# Patient Record
Sex: FEMALE | Race: WHITE | Employment: FULL TIME | URBAN - METROPOLITAN AREA
[De-identification: names, ages, dates, MRNs, and addresses within clinical notes are randomized per-mention and may not be internally consistent; named-entity substitution may affect disease eponyms.]

---

## 2017-11-09 ENCOUNTER — NON-PROVIDER VISIT (OUTPATIENT)
Dept: OBGYN | Facility: CLINIC | Age: 22
End: 2017-11-09

## 2017-11-09 DIAGNOSIS — Z32.02 PREGNANCY EXAMINATION OR TEST, NEGATIVE RESULT: ICD-10-CM

## 2017-11-09 LAB
INT CON NEG: NEGATIVE
INT CON POS: POSITIVE
POC URINE PREGNANCY TEST: NEGATIVE

## 2017-11-09 PROCEDURE — 81025 URINE PREGNANCY TEST: CPT | Performed by: OBSTETRICS & GYNECOLOGY

## 2018-01-01 ENCOUNTER — RESOLUTE PROFESSIONAL BILLING HOSPITAL PROF FEE (OUTPATIENT)
Dept: MEDSURG UNIT | Facility: MEDICAL CENTER | Age: 23
End: 2018-01-01
Payer: MEDICAID

## 2018-01-01 ENCOUNTER — HOSPITAL ENCOUNTER (INPATIENT)
Facility: MEDICAL CENTER | Age: 23
LOS: 3 days | DRG: 781 | End: 2018-01-04
Attending: EMERGENCY MEDICINE | Admitting: INTERNAL MEDICINE
Payer: MEDICAID

## 2018-01-01 ENCOUNTER — APPOINTMENT (OUTPATIENT)
Dept: RADIOLOGY | Facility: MEDICAL CENTER | Age: 23
DRG: 781 | End: 2018-01-01
Attending: EMERGENCY MEDICINE
Payer: MEDICAID

## 2018-01-01 PROBLEM — E87.1 HYPONATREMIA: Status: ACTIVE | Noted: 2018-01-01

## 2018-01-01 PROBLEM — E87.6 HYPOKALEMIA: Status: ACTIVE | Noted: 2018-01-01

## 2018-01-01 PROBLEM — O23.01 PYELONEPHRITIS AFFECTING PREGNANCY IN FIRST TRIMESTER: Status: ACTIVE | Noted: 2018-01-01

## 2018-01-01 PROBLEM — R11.2 NAUSEA & VOMITING: Status: ACTIVE | Noted: 2018-01-01

## 2018-01-01 PROBLEM — A41.9 SEPSIS (HCC): Status: ACTIVE | Noted: 2018-01-01

## 2018-01-01 LAB
ALBUMIN SERPL BCP-MCNC: 4.3 G/DL (ref 3.2–4.9)
ALBUMIN/GLOB SERPL: 1.5 G/DL
ALP SERPL-CCNC: 55 U/L (ref 30–99)
ALT SERPL-CCNC: 18 U/L (ref 2–50)
ANION GAP SERPL CALC-SCNC: 8 MMOL/L (ref 0–11.9)
APPEARANCE UR: ABNORMAL
AST SERPL-CCNC: 17 U/L (ref 12–45)
BACTERIA #/AREA URNS HPF: ABNORMAL /HPF
BASOPHILS # BLD AUTO: 0.1 % (ref 0–1.8)
BASOPHILS # BLD: 0.01 K/UL (ref 0–0.12)
BILIRUB SERPL-MCNC: 0.7 MG/DL (ref 0.1–1.5)
BILIRUB UR QL STRIP.AUTO: NEGATIVE
BUN SERPL-MCNC: 6 MG/DL (ref 8–22)
CALCIUM SERPL-MCNC: 9.3 MG/DL (ref 8.5–10.5)
CHLORIDE SERPL-SCNC: 100 MMOL/L (ref 96–112)
CO2 SERPL-SCNC: 21 MMOL/L (ref 20–33)
COLOR UR: YELLOW
CREAT SERPL-MCNC: 0.57 MG/DL (ref 0.5–1.4)
CULTURE IF INDICATED INDCX: YES UA CULTURE
EOSINOPHIL # BLD AUTO: 0 K/UL (ref 0–0.51)
EOSINOPHIL NFR BLD: 0 % (ref 0–6.9)
EPI CELLS #/AREA URNS HPF: ABNORMAL /HPF
ERYTHROCYTE [DISTWIDTH] IN BLOOD BY AUTOMATED COUNT: 42.6 FL (ref 35.9–50)
GFR SERPL CREATININE-BSD FRML MDRD: >60 ML/MIN/1.73 M 2
GLOBULIN SER CALC-MCNC: 2.9 G/DL (ref 1.9–3.5)
GLUCOSE SERPL-MCNC: 99 MG/DL (ref 65–99)
GLUCOSE UR STRIP.AUTO-MCNC: NEGATIVE MG/DL
HCG SERPL QL: POSITIVE
HCT VFR BLD AUTO: 40 % (ref 37–47)
HGB BLD-MCNC: 14.2 G/DL (ref 12–16)
HYALINE CASTS #/AREA URNS LPF: ABNORMAL /LPF
IMM GRANULOCYTES # BLD AUTO: 0.05 K/UL (ref 0–0.11)
IMM GRANULOCYTES NFR BLD AUTO: 0.4 % (ref 0–0.9)
KETONES UR STRIP.AUTO-MCNC: 15 MG/DL
LACTATE BLD-SCNC: 1.2 MMOL/L (ref 0.5–2)
LEUKOCYTE ESTERASE UR QL STRIP.AUTO: ABNORMAL
LIPASE SERPL-CCNC: 10 U/L (ref 11–82)
LYMPHOCYTES # BLD AUTO: 0.92 K/UL (ref 1–4.8)
LYMPHOCYTES NFR BLD: 6.7 % (ref 22–41)
MAGNESIUM SERPL-MCNC: 2 MG/DL (ref 1.5–2.5)
MCH RBC QN AUTO: 33.8 PG (ref 27–33)
MCHC RBC AUTO-ENTMCNC: 35.5 G/DL (ref 33.6–35)
MCV RBC AUTO: 95.2 FL (ref 81.4–97.8)
MICRO URNS: ABNORMAL
MONOCYTES # BLD AUTO: 1.08 K/UL (ref 0–0.85)
MONOCYTES NFR BLD AUTO: 7.8 % (ref 0–13.4)
NEUTROPHILS # BLD AUTO: 11.72 K/UL (ref 2–7.15)
NEUTROPHILS NFR BLD: 85 % (ref 44–72)
NITRITE UR QL STRIP.AUTO: POSITIVE
NRBC # BLD AUTO: 0 K/UL
NRBC BLD-RTO: 0 /100 WBC
PH UR STRIP.AUTO: 5.5 [PH]
PHOSPHATE SERPL-MCNC: 2.7 MG/DL (ref 2.5–4.5)
PLATELET # BLD AUTO: 186 K/UL (ref 164–446)
PMV BLD AUTO: 9.2 FL (ref 9–12.9)
POTASSIUM SERPL-SCNC: 3 MMOL/L (ref 3.6–5.5)
PROT SERPL-MCNC: 7.2 G/DL (ref 6–8.2)
PROT UR QL STRIP: 30 MG/DL
RBC # BLD AUTO: 4.2 M/UL (ref 4.2–5.4)
RBC # URNS HPF: ABNORMAL /HPF
RBC UR QL AUTO: ABNORMAL
SODIUM SERPL-SCNC: 129 MMOL/L (ref 135–145)
SP GR UR STRIP.AUTO: 1.02
UROBILINOGEN UR STRIP.AUTO-MCNC: 2 MG/DL
WBC # BLD AUTO: 13.8 K/UL (ref 4.8–10.8)
WBC #/AREA URNS HPF: ABNORMAL /HPF

## 2018-01-01 PROCEDURE — 80053 COMPREHEN METABOLIC PANEL: CPT

## 2018-01-01 PROCEDURE — 96374 THER/PROPH/DIAG INJ IV PUSH: CPT

## 2018-01-01 PROCEDURE — 87086 URINE CULTURE/COLONY COUNT: CPT

## 2018-01-01 PROCEDURE — 84703 CHORIONIC GONADOTROPIN ASSAY: CPT

## 2018-01-01 PROCEDURE — 700102 HCHG RX REV CODE 250 W/ 637 OVERRIDE(OP): Performed by: EMERGENCY MEDICINE

## 2018-01-01 PROCEDURE — A9270 NON-COVERED ITEM OR SERVICE: HCPCS | Performed by: EMERGENCY MEDICINE

## 2018-01-01 PROCEDURE — 76815 OB US LIMITED FETUS(S): CPT

## 2018-01-01 PROCEDURE — A9270 NON-COVERED ITEM OR SERVICE: HCPCS

## 2018-01-01 PROCEDURE — 81001 URINALYSIS AUTO W/SCOPE: CPT

## 2018-01-01 PROCEDURE — 700111 HCHG RX REV CODE 636 W/ 250 OVERRIDE (IP): Performed by: EMERGENCY MEDICINE

## 2018-01-01 PROCEDURE — 76817 TRANSVAGINAL US OBSTETRIC: CPT

## 2018-01-01 PROCEDURE — 36415 COLL VENOUS BLD VENIPUNCTURE: CPT

## 2018-01-01 PROCEDURE — 87186 SC STD MICRODIL/AGAR DIL: CPT

## 2018-01-01 PROCEDURE — 85025 COMPLETE CBC W/AUTO DIFF WBC: CPT

## 2018-01-01 PROCEDURE — 83605 ASSAY OF LACTIC ACID: CPT

## 2018-01-01 PROCEDURE — 87040 BLOOD CULTURE FOR BACTERIA: CPT | Mod: 91

## 2018-01-01 PROCEDURE — 770020 HCHG ROOM/CARE - TELE (206)

## 2018-01-01 PROCEDURE — 96375 TX/PRO/DX INJ NEW DRUG ADDON: CPT

## 2018-01-01 PROCEDURE — 87077 CULTURE AEROBIC IDENTIFY: CPT

## 2018-01-01 PROCEDURE — 83690 ASSAY OF LIPASE: CPT

## 2018-01-01 PROCEDURE — A9270 NON-COVERED ITEM OR SERVICE: HCPCS | Performed by: STUDENT IN AN ORGANIZED HEALTH CARE EDUCATION/TRAINING PROGRAM

## 2018-01-01 PROCEDURE — 84100 ASSAY OF PHOSPHORUS: CPT

## 2018-01-01 PROCEDURE — 99285 EMERGENCY DEPT VISIT HI MDM: CPT

## 2018-01-01 PROCEDURE — 700105 HCHG RX REV CODE 258: Performed by: EMERGENCY MEDICINE

## 2018-01-01 PROCEDURE — 700102 HCHG RX REV CODE 250 W/ 637 OVERRIDE(OP): Performed by: STUDENT IN AN ORGANIZED HEALTH CARE EDUCATION/TRAINING PROGRAM

## 2018-01-01 PROCEDURE — 700102 HCHG RX REV CODE 250 W/ 637 OVERRIDE(OP)

## 2018-01-01 PROCEDURE — 94760 N-INVAS EAR/PLS OXIMETRY 1: CPT

## 2018-01-01 PROCEDURE — 700105 HCHG RX REV CODE 258: Performed by: STUDENT IN AN ORGANIZED HEALTH CARE EDUCATION/TRAINING PROGRAM

## 2018-01-01 PROCEDURE — 83735 ASSAY OF MAGNESIUM: CPT

## 2018-01-01 PROCEDURE — 700111 HCHG RX REV CODE 636 W/ 250 OVERRIDE (IP): Performed by: STUDENT IN AN ORGANIZED HEALTH CARE EDUCATION/TRAINING PROGRAM

## 2018-01-01 RX ORDER — POTASSIUM CHLORIDE 20 MEQ/1
40 TABLET, EXTENDED RELEASE ORAL DAILY
Status: DISCONTINUED | OUTPATIENT
Start: 2018-01-02 | End: 2018-01-01

## 2018-01-01 RX ORDER — VITAMIN A ACETATE, BETA CAROTENE, ASCORBIC ACID, CHOLECALCIFEROL, .ALPHA.-TOCOPHEROL ACETATE, DL-, THIAMINE MONONITRATE, RIBOFLAVIN, NIACINAMIDE, PYRIDOXINE HYDROCHLORIDE, FOLIC ACID, CYANOCOBALAMIN, CALCIUM CARBONATE, FERROUS FUMARATE, ZINC OXIDE, CUPRIC OXIDE 3080; 12; 120; 400; 1; 1.84; 3; 20; 22; 920; 25; 200; 27; 10; 2 [IU]/1; UG/1; MG/1; [IU]/1; MG/1; MG/1; MG/1; MG/1; MG/1; [IU]/1; MG/1; MG/1; MG/1; MG/1; MG/1
1 TABLET, FILM COATED ORAL DAILY
Status: DISCONTINUED | OUTPATIENT
Start: 2018-01-02 | End: 2018-01-04 | Stop reason: HOSPADM

## 2018-01-01 RX ORDER — ONDANSETRON 2 MG/ML
4 INJECTION INTRAMUSCULAR; INTRAVENOUS ONCE
Status: COMPLETED | OUTPATIENT
Start: 2018-01-01 | End: 2018-01-01

## 2018-01-01 RX ORDER — CEFTRIAXONE 1 G/1
1 INJECTION, POWDER, FOR SOLUTION INTRAMUSCULAR; INTRAVENOUS ONCE
Status: COMPLETED | OUTPATIENT
Start: 2018-01-01 | End: 2018-01-01

## 2018-01-01 RX ORDER — PYRIDOXINE HCL (VITAMIN B6) 25 MG
25 TABLET ORAL EVERY EVENING
Status: DISCONTINUED | OUTPATIENT
Start: 2018-01-01 | End: 2018-01-04 | Stop reason: HOSPADM

## 2018-01-01 RX ORDER — SODIUM CHLORIDE 9 MG/ML
1000 INJECTION, SOLUTION INTRAVENOUS ONCE
Status: COMPLETED | OUTPATIENT
Start: 2018-01-01 | End: 2018-01-01

## 2018-01-01 RX ORDER — ONDANSETRON 2 MG/ML
4 INJECTION INTRAMUSCULAR; INTRAVENOUS EVERY 6 HOURS PRN
Status: DISCONTINUED | OUTPATIENT
Start: 2018-01-01 | End: 2018-01-04 | Stop reason: HOSPADM

## 2018-01-01 RX ORDER — SODIUM CHLORIDE 9 MG/ML
INJECTION, SOLUTION INTRAVENOUS CONTINUOUS
Status: DISCONTINUED | OUTPATIENT
Start: 2018-01-01 | End: 2018-01-04 | Stop reason: HOSPADM

## 2018-01-01 RX ORDER — ACETAMINOPHEN 500 MG
1000 TABLET ORAL EVERY 6 HOURS PRN
COMMUNITY
End: 2022-10-04

## 2018-01-01 RX ORDER — POTASSIUM CHLORIDE 20 MEQ/1
40 TABLET, EXTENDED RELEASE ORAL ONCE
Status: COMPLETED | OUTPATIENT
Start: 2018-01-01 | End: 2018-01-01

## 2018-01-01 RX ORDER — ACETAMINOPHEN 325 MG/1
1000 TABLET ORAL ONCE
Status: DISCONTINUED | OUTPATIENT
Start: 2018-01-01 | End: 2018-01-01

## 2018-01-01 RX ORDER — CEFTRIAXONE 1 G/1
1 INJECTION, POWDER, FOR SOLUTION INTRAMUSCULAR; INTRAVENOUS ONCE
Status: DISCONTINUED | OUTPATIENT
Start: 2018-01-02 | End: 2018-01-01

## 2018-01-01 RX ORDER — ACETAMINOPHEN 325 MG/1
325 TABLET ORAL ONCE
Status: COMPLETED | OUTPATIENT
Start: 2018-01-01 | End: 2018-01-01

## 2018-01-01 RX ORDER — ACETAMINOPHEN 325 MG/1
650 TABLET ORAL ONCE
Status: COMPLETED | OUTPATIENT
Start: 2018-01-01 | End: 2018-01-01

## 2018-01-01 RX ADMIN — THIAMINE HYDROCHLORIDE 100 MG: 100 INJECTION, SOLUTION INTRAMUSCULAR; INTRAVENOUS at 23:49

## 2018-01-01 RX ADMIN — ACETAMINOPHEN 650 MG: 325 TABLET, FILM COATED ORAL at 20:50

## 2018-01-01 RX ADMIN — CEFTRIAXONE SODIUM 1 G: 1 INJECTION, POWDER, FOR SOLUTION INTRAMUSCULAR; INTRAVENOUS at 20:50

## 2018-01-01 RX ADMIN — POTASSIUM CHLORIDE 40 MEQ: 1500 TABLET, EXTENDED RELEASE ORAL at 23:40

## 2018-01-01 RX ADMIN — POTASSIUM CHLORIDE 40 MEQ: 2 INJECTION, SOLUTION, CONCENTRATE INTRAVENOUS at 23:43

## 2018-01-01 RX ADMIN — ONDANSETRON 4 MG: 2 INJECTION INTRAMUSCULAR; INTRAVENOUS at 20:50

## 2018-01-01 RX ADMIN — DOXYLAMINE SUCCINATE 25 MG: 25 TABLET ORAL at 23:42

## 2018-01-01 RX ADMIN — SODIUM CHLORIDE 1000 ML: 9 INJECTION, SOLUTION INTRAVENOUS at 20:50

## 2018-01-01 RX ADMIN — Medication 25 MG: at 23:42

## 2018-01-01 RX ADMIN — ACETAMINOPHEN 325 MG: 325 TABLET, FILM COATED ORAL at 19:00

## 2018-01-01 ASSESSMENT — PAIN SCALES - GENERAL
PAINLEVEL_OUTOF10: 9
PAINLEVEL_OUTOF10: 0

## 2018-01-01 ASSESSMENT — ENCOUNTER SYMPTOMS
HEADACHES: 1
SHORTNESS OF BREATH: 0
DIARRHEA: 0
BLURRED VISION: 0
CHILLS: 1
DIAPHORESIS: 0
LOSS OF CONSCIOUSNESS: 0
SPUTUM PRODUCTION: 0
CONSTIPATION: 0
HEMOPTYSIS: 0
FOCAL WEAKNESS: 0
SEIZURES: 0
DOUBLE VISION: 0
DIZZINESS: 0
COUGH: 0
FEVER: 1
FALLS: 0
FLANK PAIN: 1
PALPITATIONS: 0
ABDOMINAL PAIN: 0
NAUSEA: 1
BACK PAIN: 1
WEAKNESS: 0
DEPRESSION: 0
VOMITING: 1

## 2018-01-01 ASSESSMENT — COGNITIVE AND FUNCTIONAL STATUS - GENERAL
SUGGESTED CMS G CODE MODIFIER DAILY ACTIVITY: CH
DAILY ACTIVITIY SCORE: 24
SUGGESTED CMS G CODE MODIFIER MOBILITY: CH
MOBILITY SCORE: 24

## 2018-01-01 ASSESSMENT — COPD QUESTIONNAIRES
DURING THE PAST 4 WEEKS HOW MUCH DID YOU FEEL SHORT OF BREATH: NONE/LITTLE OF THE TIME
DO YOU EVER COUGH UP ANY MUCUS OR PHLEGM?: NO/ONLY WITH OCCASIONAL COLDS OR INFECTIONS
HAVE YOU SMOKED AT LEAST 100 CIGARETTES IN YOUR ENTIRE LIFE: NO/DON'T KNOW
COPD SCREENING SCORE: 0

## 2018-01-01 ASSESSMENT — LIFESTYLE VARIABLES
EVER_SMOKED: NEVER
ALCOHOL_USE: NO

## 2018-01-01 ASSESSMENT — PATIENT HEALTH QUESTIONNAIRE - PHQ9
SUM OF ALL RESPONSES TO PHQ QUESTIONS 1-9: 0
SUM OF ALL RESPONSES TO PHQ9 QUESTIONS 1 AND 2: 0
2. FEELING DOWN, DEPRESSED, IRRITABLE, OR HOPELESS: NOT AT ALL
1. LITTLE INTEREST OR PLEASURE IN DOING THINGS: NOT AT ALL

## 2018-01-01 NOTE — ED NOTES
Chief Complaint   Patient presents with   • Headache     Pt BIB self to triage for c/o fever/headache/flank pain since Fri/ pt reports to be 9 wks preg/ pt reports mild discomfort while urinating   • Vomiting   • Nausea   • Fever   • Flank Pain     Explained to pt triage process, made pt aware to tell this RN of any changes/concerns, pt verbalized understanding of process and instructions given. Pt to [facility] stone.

## 2018-01-02 LAB
ALBUMIN SERPL BCP-MCNC: 3.5 G/DL (ref 3.2–4.9)
ALBUMIN/GLOB SERPL: 1.2 G/DL
ALP SERPL-CCNC: 58 U/L (ref 30–99)
ALT SERPL-CCNC: 14 U/L (ref 2–50)
ANION GAP SERPL CALC-SCNC: 10 MMOL/L (ref 0–11.9)
AST SERPL-CCNC: 13 U/L (ref 12–45)
BASOPHILS # BLD AUTO: 0.1 % (ref 0–1.8)
BASOPHILS # BLD: 0.02 K/UL (ref 0–0.12)
BILIRUB SERPL-MCNC: 1 MG/DL (ref 0.1–1.5)
BUN SERPL-MCNC: 6 MG/DL (ref 8–22)
CALCIUM SERPL-MCNC: 9 MG/DL (ref 8.5–10.5)
CHLORIDE SERPL-SCNC: 107 MMOL/L (ref 96–112)
CO2 SERPL-SCNC: 18 MMOL/L (ref 20–33)
CREAT SERPL-MCNC: 0.5 MG/DL (ref 0.5–1.4)
EOSINOPHIL # BLD AUTO: 0 K/UL (ref 0–0.51)
EOSINOPHIL NFR BLD: 0 % (ref 0–6.9)
ERYTHROCYTE [DISTWIDTH] IN BLOOD BY AUTOMATED COUNT: 42.5 FL (ref 35.9–50)
GFR SERPL CREATININE-BSD FRML MDRD: >60 ML/MIN/1.73 M 2
GLOBULIN SER CALC-MCNC: 2.9 G/DL (ref 1.9–3.5)
GLUCOSE SERPL-MCNC: 90 MG/DL (ref 65–99)
HCT VFR BLD AUTO: 39 % (ref 37–47)
HGB BLD-MCNC: 13.6 G/DL (ref 12–16)
IMM GRANULOCYTES # BLD AUTO: 0.09 K/UL (ref 0–0.11)
IMM GRANULOCYTES NFR BLD AUTO: 0.6 % (ref 0–0.9)
LACTATE BLD-SCNC: 1 MMOL/L (ref 0.5–2)
LYMPHOCYTES # BLD AUTO: 0.75 K/UL (ref 1–4.8)
LYMPHOCYTES NFR BLD: 5.2 % (ref 22–41)
MCH RBC QN AUTO: 32.8 PG (ref 27–33)
MCHC RBC AUTO-ENTMCNC: 34.9 G/DL (ref 33.6–35)
MCV RBC AUTO: 94 FL (ref 81.4–97.8)
MONOCYTES # BLD AUTO: 1.26 K/UL (ref 0–0.85)
MONOCYTES NFR BLD AUTO: 8.8 % (ref 0–13.4)
NEUTROPHILS # BLD AUTO: 12.23 K/UL (ref 2–7.15)
NEUTROPHILS NFR BLD: 85.3 % (ref 44–72)
NRBC # BLD AUTO: 0 K/UL
NRBC BLD-RTO: 0 /100 WBC
PLATELET # BLD AUTO: 161 K/UL (ref 164–446)
PMV BLD AUTO: 9.5 FL (ref 9–12.9)
POTASSIUM SERPL-SCNC: 4 MMOL/L (ref 3.6–5.5)
PROT SERPL-MCNC: 6.4 G/DL (ref 6–8.2)
RBC # BLD AUTO: 4.15 M/UL (ref 4.2–5.4)
SODIUM SERPL-SCNC: 135 MMOL/L (ref 135–145)
WBC # BLD AUTO: 14.4 K/UL (ref 4.8–10.8)

## 2018-01-02 PROCEDURE — 83605 ASSAY OF LACTIC ACID: CPT

## 2018-01-02 PROCEDURE — 700101 HCHG RX REV CODE 250: Performed by: STUDENT IN AN ORGANIZED HEALTH CARE EDUCATION/TRAINING PROGRAM

## 2018-01-02 PROCEDURE — 700111 HCHG RX REV CODE 636 W/ 250 OVERRIDE (IP): Performed by: INTERNAL MEDICINE

## 2018-01-02 PROCEDURE — 85025 COMPLETE CBC W/AUTO DIFF WBC: CPT

## 2018-01-02 PROCEDURE — 80053 COMPREHEN METABOLIC PANEL: CPT

## 2018-01-02 PROCEDURE — 700105 HCHG RX REV CODE 258: Performed by: STUDENT IN AN ORGANIZED HEALTH CARE EDUCATION/TRAINING PROGRAM

## 2018-01-02 PROCEDURE — 770020 HCHG ROOM/CARE - TELE (206)

## 2018-01-02 PROCEDURE — 700102 HCHG RX REV CODE 250 W/ 637 OVERRIDE(OP): Performed by: STUDENT IN AN ORGANIZED HEALTH CARE EDUCATION/TRAINING PROGRAM

## 2018-01-02 PROCEDURE — A9270 NON-COVERED ITEM OR SERVICE: HCPCS | Performed by: STUDENT IN AN ORGANIZED HEALTH CARE EDUCATION/TRAINING PROGRAM

## 2018-01-02 PROCEDURE — 700111 HCHG RX REV CODE 636 W/ 250 OVERRIDE (IP): Performed by: STUDENT IN AN ORGANIZED HEALTH CARE EDUCATION/TRAINING PROGRAM

## 2018-01-02 PROCEDURE — 99223 1ST HOSP IP/OBS HIGH 75: CPT | Mod: GC | Performed by: INTERNAL MEDICINE

## 2018-01-02 PROCEDURE — 3E0234Z INTRODUCTION OF SERUM, TOXOID AND VACCINE INTO MUSCLE, PERCUTANEOUS APPROACH: ICD-10-PCS | Performed by: INTERNAL MEDICINE

## 2018-01-02 PROCEDURE — 90471 IMMUNIZATION ADMIN: CPT

## 2018-01-02 PROCEDURE — 36415 COLL VENOUS BLD VENIPUNCTURE: CPT

## 2018-01-02 PROCEDURE — 90686 IIV4 VACC NO PRSV 0.5 ML IM: CPT | Performed by: INTERNAL MEDICINE

## 2018-01-02 RX ORDER — ACETAMINOPHEN 325 MG/1
650 TABLET ORAL EVERY 6 HOURS PRN
Status: DISCONTINUED | OUTPATIENT
Start: 2018-01-02 | End: 2018-01-04 | Stop reason: HOSPADM

## 2018-01-02 RX ORDER — SODIUM CHLORIDE 9 MG/ML
1000 INJECTION, SOLUTION INTRAVENOUS ONCE
Status: COMPLETED | OUTPATIENT
Start: 2018-01-02 | End: 2018-01-02

## 2018-01-02 RX ADMIN — FOLIC ACID 1 MG: 5 INJECTION, SOLUTION INTRAMUSCULAR; INTRAVENOUS; SUBCUTANEOUS at 00:20

## 2018-01-02 RX ADMIN — DOXYLAMINE SUCCINATE 25 MG: 25 TABLET ORAL at 21:47

## 2018-01-02 RX ADMIN — Medication 1 TABLET: at 08:16

## 2018-01-02 RX ADMIN — SODIUM CHLORIDE 1000 ML: 9 INJECTION, SOLUTION INTRAVENOUS at 08:00

## 2018-01-02 RX ADMIN — SODIUM CHLORIDE: 9 INJECTION, SOLUTION INTRAVENOUS at 17:40

## 2018-01-02 RX ADMIN — INFLUENZA A VIRUS A/MICHIGAN/45/2015 X-275 (H1N1) ANTIGEN (FORMALDEHYDE INACTIVATED), INFLUENZA A VIRUS A/HONG KONG/4801/2014 X-263B (H3N2) ANTIGEN (FORMALDEHYDE INACTIVATED), INFLUENZA B VIRUS B/PHUKET/3073/2013 ANTIGEN (FORMALDEHYDE INACTIVATED), AND INFLUENZA B VIRUS B/BRISBANE/60/2008 ANTIGEN (FORMALDEHYDE INACTIVATED) 0.5 ML: 15; 15; 15; 15 INJECTION, SUSPENSION INTRAMUSCULAR at 08:16

## 2018-01-02 RX ADMIN — SODIUM CHLORIDE: 9 INJECTION, SOLUTION INTRAVENOUS at 21:50

## 2018-01-02 RX ADMIN — SODIUM CHLORIDE: 9 INJECTION, SOLUTION INTRAVENOUS at 09:32

## 2018-01-02 RX ADMIN — Medication 25 MG: at 21:47

## 2018-01-02 RX ADMIN — ACETAMINOPHEN 650 MG: 325 TABLET, FILM COATED ORAL at 09:11

## 2018-01-02 RX ADMIN — SODIUM CHLORIDE: 9 INJECTION, SOLUTION INTRAVENOUS at 03:05

## 2018-01-02 RX ADMIN — WATER 1000 MG: 1 INJECTION INTRAMUSCULAR; INTRAVENOUS; SUBCUTANEOUS at 11:37

## 2018-01-02 RX ADMIN — ACETAMINOPHEN 650 MG: 325 TABLET, FILM COATED ORAL at 22:21

## 2018-01-02 ASSESSMENT — ENCOUNTER SYMPTOMS
SHORTNESS OF BREATH: 0
BLURRED VISION: 0
CONSTIPATION: 0
RESPIRATORY NEGATIVE: 1
FEVER: 0
WEAKNESS: 0
NEUROLOGICAL NEGATIVE: 1
PALPITATIONS: 0
HEADACHES: 0
CONSTITUTIONAL NEGATIVE: 1
NAUSEA: 0
VOMITING: 0
DIZZINESS: 0
GASTROINTESTINAL NEGATIVE: 1
BRUISES/BLEEDS EASILY: 0
FALLS: 0
MUSCULOSKELETAL NEGATIVE: 1
CARDIOVASCULAR NEGATIVE: 1
SORE THROAT: 0
COUGH: 0
MYALGIAS: 0
EYES NEGATIVE: 1
DIAPHORESIS: 0
CHILLS: 0
DIARRHEA: 0
HEARTBURN: 0
ABDOMINAL PAIN: 0

## 2018-01-02 ASSESSMENT — PAIN SCALES - GENERAL
PAINLEVEL_OUTOF10: 0
PAINLEVEL_OUTOF10: 3
PAINLEVEL_OUTOF10: 0

## 2018-01-02 NOTE — PROGRESS NOTES
PT arrived to floor accompanied by tele RN and boyfriend on [employer]. PT walked to bed, gait steady, and was placed on tele box and confirmed. Personal items and call bell in reach, whiteboard updated.

## 2018-01-02 NOTE — PROGRESS NOTES
Internal Medicine Interval Note  Note Author: Rosalba Poole M.D.     Name Marlen Jones       1995   Age/Sex 22 y.o. female   MRN [id number]   Code Status Full     After 5PM or if no immediate response to page, please call for cross-coverage  Attending/Team: Ave/ kevyn See Patient List for primary contact information  Call [phone] to page    1st Call - Day Intern (R1):   Virgilio 2nd Call - Day Sr. Resident (R2/R3):   Jacklyn         Reason for interval visit  (Principal Problem)   Pyelonephritis affecting pregnancy in first trimester    Interval Problem Daily Status Update  (24 hours)   Patient states she feels a lot better no longer has abdominal pain, flank pain, nausea/vomiting.  She did spike a fever earlier but was not on acetaminophen.  Review of previous cultures when she had a UTI in  show no growth. We'll continue with ceftriaxone, if she spikes another fever will expand antibiotics.    Review of Systems   Constitutional: Negative.  Negative for chills, diaphoresis and fever.   HENT: Negative.  Negative for hearing loss and sore throat.    Eyes: Negative.  Negative for blurred vision.   Respiratory: Negative.  Negative for cough and shortness of breath.    Cardiovascular: Negative.  Negative for chest pain, palpitations and leg swelling.   Gastrointestinal: Negative.  Negative for abdominal pain, constipation, diarrhea, heartburn, nausea and vomiting.   Genitourinary: Negative.  Negative for dysuria, frequency, hematuria and urgency.   Musculoskeletal: Negative.  Negative for falls and myalgias.   Skin: Negative.  Negative for rash.   Neurological: Negative.  Negative for dizziness, weakness and headaches.   Endo/Heme/Allergies: Negative.  Does not bruise/bleed easily.       Consultants/Specialty  none    Disposition  Inpt    Quality Measures    Reviewed items::  EKG reviewed, Labs reviewed, Medications reviewed and Radiology images reviewed  Navarro catheter::  No Navarro  DVT prophylaxis  "pharmacological::  Enoxaparin (Lovenox)  Ulcer Prophylaxis::  No          Physical Exam       Vitals:    01/01/18 2200 01/01/18 2300 01/02/18 0323 01/02/18 0816   BP:  113/70 108/61 (!) 99/63   Pulse: 95 91 100 (!) 116   Resp: 16 18 18 20   Temp:  (!) 38.1 °C (100.5 °F) 37.9 °C (100.2 °F) (!) 38.9 °C (102.1 °F)   SpO2: 99% 100% 98% 100%   Weight:  58.8 kg (129 lb 10.1 oz)     Height:  1.702 m (5' 7\")       Body mass index is 20.3 kg/m². Weight: 58.8 kg (129 lb 10.1 oz)  Oxygen Therapy:  Pulse Oximetry: 100 %, O2 (LPM): 0, O2 Delivery: None (Room Air)    Physical Exam   Constitutional: She is oriented to person, place, and time and well-developed, well-nourished, and in no distress. No distress.   HENT:   Head: Normocephalic and atraumatic.   Mouth/Throat: Oropharynx is clear and moist. No oropharyngeal exudate.   Eyes: Conjunctivae and EOM are normal. Pupils are equal, round, and reactive to light.   Neck: Normal range of motion. Neck supple.   Cardiovascular: Normal rate, regular rhythm and normal heart sounds.    No murmur heard.  Pulmonary/Chest: Effort normal and breath sounds normal. No respiratory distress. She has no wheezes. She has no rales.   Abdominal: Soft. Bowel sounds are normal. She exhibits no distension. There is no tenderness. There is no rebound and no guarding.   Musculoskeletal: Normal range of motion. She exhibits no edema or tenderness.   Lymphadenopathy:     She has no cervical adenopathy.   Neurological: She is alert and oriented to person, place, and time. No cranial nerve deficit.   Skin: Skin is warm. She is not diaphoretic. No erythema.         Lab Data Review:         1/2/2018  2:19 PM    Recent Labs      01/01/18   1438  01/02/18   0312   SODIUM  129*  135   POTASSIUM  3.0*  4.0   CHLORIDE  100  107   CO2  21  18*   BUN  6*  6*   CREATININE  0.57  0.50   MAGNESIUM  2.0   --    PHOSPHORUS  2.7   --    CALCIUM  9.3  9.0       Recent Labs      01/01/18   1438  01/02/18   0312   ALTSGPT  " 18  14   ASTSGOT  17  13   ALKPHOSPHAT  55  58   TBILIRUBIN  0.7  1.0   LIPASE  10*   --    GLUCOSE  99  90       Recent Labs      18   1438  18   0312   RBC  4.20  4.15*   HEMOGLOBIN  14.2  13.6   HEMATOCRIT  40.0  39.0   PLATELETCT  186  161*       Recent Labs      18   1438  18   0312   WBC  13.8*  14.4*   NEUTSPOLYS  85.00*  85.30*   LYMPHOCYTES  6.70*  5.20*   MONOCYTES  7.80  8.80   EOSINOPHILS  0.00  0.00   BASOPHILS  0.10  0.10   ASTSGOT  17  13   ALTSGPT  18  14   ALKPHOSPHAT  55  58   TBILIRUBIN  0.7  1.0           Assessment/Plan     * Pyelonephritis affecting pregnancy in first trimester   Assessment & Plan    - Dysuria, bilateral flank pain, fever/chills, dysuria and bilateral CVA tenderness on admission  - UA shows evidence of UTI, sepsis 4/4 SIRS criteria   - Urine and blood culture pending  - Continue IV ceftriaxone, she spikes a fever will expand antibiotic coverage, and order renal ultrasound          Sepsis (CMS-HCC)- (present on admission)   Assessment & Plan    - on admission Sepsis 4/4 elevated, qSofa 1/3 (tachynpena). Lactic acid 1.2   - Urine and blood cultures pending  - Continue IV fluids and ceftriaxone          Pregnancy- (present on admission)   Assessment & Plan    Patient , is 9 weeks pregnant.  She is having multiple episodes of emesis daily. This could be secondary to pregnancy (hyperemeisis) vs pyelonephritis   Transvaginal US: There is a single live IUP estimated at 8 weeks 5 days gestational age     Plan   Multivitamins daily   Transvaginal US - rule out ectopic pregnancy. There is a IUP         Nausea & vomiting- (present on admission)   Assessment & Plan    - Patient reported 2-3 episodes of vomiting daily for the past 3 weeks, non bloody  - May be due to pregnancy, pain from pyelonephritis  - started on zofran, Doxylamine 20mg / Pyridoxine 20mg  - No longer complaining of nausea, will DC Zofran        Hypokalemia- (present on admission)   Assessment  & Plan    - Most likely due to dehydration and vomiting, replete as necessary        Hyponatremia- (present on admission)   Assessment & Plan    - Most likely due to dehydration, repleted as necessary

## 2018-01-02 NOTE — ASSESSMENT & PLAN NOTE
Patient , is 9 weeks pregnant.  She is having multiple episodes of emesis daily. This could be secondary to pregnancy (hyperemeisis) vs pyelonephritis   Transvaginal US: There is a single live IUP estimated at 8 weeks 5 days gestational age     Plan   Multivitamins daily   Transvaginal US - rule out ectopic pregnancy. There is a IUP

## 2018-01-02 NOTE — ED NOTES
Rechecked pt's vitals.   , +fever.   Medicated with tylenol 325mg per protocol   Apologized for the wait, informed charge rn.

## 2018-01-02 NOTE — SENIOR ADMIT NOTE
Admission  [facility] ([employer])  Telemetry for severe hypokalemia  Prelim Dx: Pyelonephritis, Hyperemesis gravidarum, rule out ectopic pregnancy, severe hypokalemia    History  Ms Jones is a 22 year old female who presents with fever, dysuria, headache, nausea and vomiting of three days duration. She is  And nine weeks pregnant. She had pyelonephritis during her last pregnancy. She had some abdominal cramping 10 days ago that has since resolved. Denies nuchal rigidity or LOC.     In the ED the patient was medicated with acetaminophen, zofran and ceftriaxone. Blood cultures were drawn prior to antibiotic administration. She received one liter of normal saline after which her pulse decreased from 130 to 100.     Physical exam  Non-toxic  No peritoneal findings, mild suprapubic tenderness, CVA on right (mild)    Assessment/Plan  Sepsis: Tachycardia, fever, leukocytosis. Blood pressure stable. Lactate 1.2. Okay for the floor. Urinary source suggested by history of dysuria, nitrite and leukocyte esterase.   - 150cc/hour (with thiamine and folate)   - Ceftriaxone   - Reassess every four to six hours for improvement  Pyelonephritis: Nitrite, LE on urinalysis. Right CVA.   - Continue ceftriaxone (first dose in ED)   - Blood and urine cultures pending   - IVNS at 150cc/hour  Nausea/Vomiting/Dehydration: Secondary to pyelonephritis vs hyperemesis gravidarum.  Hyperemesis typically peaks at nine weeks (she states she's nine weeks pregnant).    - Doxylamine 20mg / Pyridoxine 20mg at bedtime if able to tolerate PO   - Ondansetron if unable to tolerate PO    - Discontinue zofran when vomiting resolves   - IV fluids with thiamine 100 mg and folic acid 1mg   - Advance diet as tolerated  Abdominal pain/Pregnancy: Prior episodes of abdominal cramping several days ago, now resolved. No bleeding.     - Follow up TVUS   - measure the hCG every two days    - An hCG rise of ?35 percent in two days should be considered potential  ectopic  Hypokalemia: Excellent renal function, replace   - Tele monitoring   - 40 by mouth and 40 IV   - If cannot tolerate oral page MD   - Repeat BMP in the am   - Check magnesium and phosphorous   Hyponatremia: Hypovolemic, secondary to emesis   - IV hydration   - Repeat BMP at 3am

## 2018-01-02 NOTE — PROGRESS NOTES
Report received from PM RN, care of patient assumed. POC discussed at bedside, no immediate concerns stated at this time. Safety measures in place/call light in reach. Will continue to round hourly.   Patient up to bathroom at this time, no mobility issues noted.

## 2018-01-02 NOTE — ASSESSMENT & PLAN NOTE
- Patient reported 2-3 episodes of vomiting daily for the past 3 weeks, non bloody  - May be due to pregnancy, pain from pyelonephritis  - started on zofran, Doxylamine 20mg / Pyridoxine 20mg  - d/c on doxylamine/pyridoxine

## 2018-01-02 NOTE — ED NOTES
Med rec completed by patient interview at bedside  No abx in past 30 days  NKDA confirmed by patient

## 2018-01-02 NOTE — ED PROVIDER NOTES
ED Provider Note    Scribed for Luigi Lam M.D. by Zachary Lee. 2018, 8:27 PM.    Means of arrival: Walk-in  History obtained from: Patient  History limited by: None    CHIEF COMPLAINT  Chief Complaint   Patient presents with   • Headache     Pt BIB self to triage for c/o fever/headache/flank pain since Fri/ pt reports to be 9 wks preg/ pt reports mild discomfort while urinating   • Vomiting   • Nausea   • Fever   • Flank Pain       HPI  Marlen Jones is a 22 y.o. female who presents to the Emergency Department complaining of a constant fever and headache began three days ago. Patient reports associated dysuria that began several days ago, vomiting that began one week ago, nausea, decreased PO solid and fluid intake since onset of the vomiting. He denies vision change, sore throat, chest pain, shortness of breath, focal muscle pain, rashes, or neurologic deficits. Patient is status  at nine weeks by pregnancy test. She reports a history of pyelonephritis at seven months into her first pregnancy. She reports that she began to experience cramping 10 days ago, but now she is only experiencing constant and mild lower back pain. Patient reports good relief of her nausea during her first pregnancy. She is otherwise healthy.    REVIEW OF SYSTEMS  Patient reports headache, fever, dysuria, vomiting, cramping (resolved), lower back pain, nausea, decreased PO solid and fluid intake since onset of the vomiting. Patient denies vision change, sore throat, chest pain, shortness of breath, focal muscle pain, rashes, or neurologic deficits.   C    PAST MEDICAL HISTORY   None noted.    SURGICAL HISTORY  patient denies any surgical history    SOCIAL HISTORY  Social History   Substance Use Topics   • Smoking status: Never Smoker   • Smokeless tobacco: Never Used   • Alcohol use No      History   Drug Use No       FAMILY HISTORY  History reviewed. No pertinent family history.    CURRENT MEDICATIONS  Reviewed.   "See Encounter Summary.     ALLERGIES  Allergies   Allergen Reactions   • Nkda [No Known Drug Allergy]        PHYSICAL EXAM  VITAL SIGNS: /69   Pulse (!) 111   Temp (!) 38.4 °C (101.1 °F)   Resp 20   Ht 1.702 m (5' 7\")   Wt 58.1 kg (128 lb 1.4 oz)   LMP 10/28/2017 (Exact Date)   SpO2 97%   BMI 20.06 kg/m²    Pulse ox interpretation: I interpret this pulse ox as normal.  Constitutional: Alert in no apparent distress.  HENT: Head normocephalic, atraumatic, Bilateral external ears normal, Nose normal.  Eyes: Pupils are equal, extraocular movements intact, Conjunctiva normal, Non-icteric.   Neck: Normal range of motion, Supple, No stridor.   Cardiovascular: Tachycardic rate, Regular rhythm   Thorax & Lungs: No acute respiratory distress, No wheezing, No increased work of breathing.   Abdomen: Soft, Non tender, Non-distended, No pulsatile masses, No peritoneal signs.  Skin: Warm, Dry, No erythema, No rash.   Extremities: Intact distal pulses, No edema, No tenderness, No cyanosis.    Neurologic: Alert , Normal motor function, Normal sensory function, No focal deficits noted.   Psychiatric: Affect normal, Judgment normal, Mood normal.       DIAGNOSTIC STUDIES / PROCEDURES     LABS  Results for orders placed or performed during the hospital encounter of 01/01/18   CBC WITH DIFFERENTIAL   Result Value Ref Range    WBC 13.8 (H) 4.8 - 10.8 K/uL    RBC 4.20 4.20 - 5.40 M/uL    Hemoglobin 14.2 12.0 - 16.0 g/dL    Hematocrit 40.0 37.0 - 47.0 %    MCV 95.2 81.4 - 97.8 fL    MCH 33.8 (H) 27.0 - 33.0 pg    MCHC 35.5 (H) 33.6 - 35.0 g/dL    RDW 42.6 35.9 - 50.0 fL    Platelet Count 186 164 - 446 K/uL    MPV 9.2 9.0 - 12.9 fL    Neutrophils-Polys 85.00 (H) 44.00 - 72.00 %    Lymphocytes 6.70 (L) 22.00 - 41.00 %    Monocytes 7.80 0.00 - 13.40 %    Eosinophils 0.00 0.00 - 6.90 %    Basophils 0.10 0.00 - 1.80 %    Immature Granulocytes 0.40 0.00 - 0.90 %    Nucleated RBC 0.00 /100 WBC    Neutrophils (Absolute) 11.72 (H) 2.00 " - 7.15 K/uL    Lymphs (Absolute) 0.92 (L) 1.00 - 4.80 K/uL    Monos (Absolute) 1.08 (H) 0.00 - 0.85 K/uL    Eos (Absolute) 0.00 0.00 - 0.51 K/uL    Baso (Absolute) 0.01 0.00 - 0.12 K/uL    Immature Granulocytes (abs) 0.05 0.00 - 0.11 K/uL    NRBC (Absolute) 0.00 K/uL   COMP METABOLIC PANEL   Result Value Ref Range    Sodium 129 (L) 135 - 145 mmol/L    Potassium 3.0 (L) 3.6 - 5.5 mmol/L    Chloride 100 96 - 112 mmol/L    Co2 21 20 - 33 mmol/L    Anion Gap 8.0 0.0 - 11.9    Glucose 99 65 - 99 mg/dL    Bun 6 (L) 8 - 22 mg/dL    Creatinine 0.57 0.50 - 1.40 mg/dL    Calcium 9.3 8.5 - 10.5 mg/dL    AST(SGOT) 17 12 - 45 U/L    ALT(SGPT) 18 2 - 50 U/L    Alkaline Phosphatase 55 30 - 99 U/L    Total Bilirubin 0.7 0.1 - 1.5 mg/dL    Albumin 4.3 3.2 - 4.9 g/dL    Total Protein 7.2 6.0 - 8.2 g/dL    Globulin 2.9 1.9 - 3.5 g/dL    A-G Ratio 1.5 g/dL   LIPASE   Result Value Ref Range    Lipase 10 (L) 11 - 82 U/L   URINALYSIS,CULTURE IF INDICATED   Result Value Ref Range    Color Yellow     Character Cloudy (A)     Specific Gravity 1.016 <1.035    Ph 5.5 5.0 - 8.0    Glucose Negative Negative mg/dL    Ketones 15 (A) Negative mg/dL    Protein 30 (A) Negative mg/dL    Bilirubin Negative Negative    Urobilinogen, Urine 2.0 Negative    Nitrite Positive (A) Negative    Leukocyte Esterase Moderate (A) Negative    Occult Blood Moderate (A) Negative    Micro Urine Req Microscopic     Culture Indicated Yes UA Culture   BETA-HCG QUALITATIVE SERUM   Result Value Ref Range    Beta-Hcg Qualitative Serum Positive (A) Negative   URINE MICROSCOPIC (W/UA)   Result Value Ref Range    WBC Packed WBC /hpf    RBC 10-20 (A) /hpf    Bacteria Many (A) None /hpf    Epithelial Cells Few /hpf    Hyaline Cast 3-5 (A) /lpf   ESTIMATED GFR   Result Value Ref Range    GFR If African American >60 >60 mL/min/1.73 m 2    GFR If Non African American >60 >60 mL/min/1.73 m 2     All labs were reviewed by me.    RADIOLOGY  US-OB LIMITED TRANSABDOMINAL   Final Result       1.  There is a single live IUP estimated at 8 weeks 5 days gestational age with an BRE of 8/8/2018 which is within one week of the clinical dates.   2.  Fetal heart rate is 195 BPM.   3.  The ovaries are within normal limits.        The radiologist's interpretation of all radiological studies have been reviewed by me.    COURSE & MEDICAL DECISION MAKING  Pertinent Labs & Imaging studies reviewed. (See chart for details)    6:57 PM Patient was treated with acetaminophen 325 mg.    8:27 PM - Patient seen and examined at bedside. I discussed the necessity of admittance for treatment with IV antibiotics overnight. Patient will be treated with NS infusion 1 liter for vomiting and sepsis, ondansetron injection 4 mg, ceftriaxone injection 1 g, and acetaminophen tablet 650 mg. Ordered US OB pelvis transvaginal, lactic acid, Blood cultures x2, urine microscopic with U/A, estimated GFR, CBC with differential, CMP, Lipase, U/A culture if indicated, Beta-HCG qualitative serum, and urine culture to evaluate her symptoms.     8:36 PM Paged Hospitalist.    8:37 PM I discussed the patient's case and the above findings with Dr. Xiao (Hospitalist) who agrees to transfer care of the patient.    8:43 PM I discussed the patient's case and the above findings with Dr. Xiao (Hospitalist) who agrees to admit to [facility] instead.    8:44 PM Paged [facility].    8:48 PM I discussed the patient's case and the above findings with [facility] who agrees to transfer care of the patient at this time.    Decision Making:  This is a 22 y.o. year old female who presents with flank pain, nausea, and vomiting. Here on examination, the patient has a soft and nontender abdomen. She is however tachycardic, and presented with with a fever. The patient has a leukocytosis here as well, and given her profound tachycardia, was given IV fluids for resuscitation, Tylenol for antipyresis, and ceftriaxone treatment for  pyelonephritis noted on urinalysis. The patient will be admitted to the hospitalist service secondary to concern for the patient's underlying pregnancy. Ultrasound here shows no evidence of abnormality, and the patient will be monitored and given further treatment with antibiotics on an inpatient basis.    DISPOSITION:  Patient will be admitted to [facility] in guarded condition.    FINAL IMPRESSION  1. Pyelonephritis      Zachary MAIN (Scribe), am scribing for, and in the presence of, Luigi Lam M.D..    Electronically signed by: Zachary Lee (Scribe), 1/1/2018    Luigi MAIN M.D. personally performed the services described in this documentation, as scribed by Zachary Lee in my presence, and it is both accurate and complete.    The note accurately reflects work and decisions made by me.  Luigi Lam  1/2/2018  1:42 AM

## 2018-01-02 NOTE — DIETARY
Nutrition Services: Pt seen for poor PO intake per nutrition admit screen.    Pt is a 21 yo F admitted for Sepsis, Pyelonephritis affecting pregnancy in first trimester. Pt is 9 weeks pregnant per H&P.  History reviewed. No pertinent past medical history.    Visited pt at bedside, pt appears well nourished. Pt states that she has had poor appetite for a couple days pta r/t morning sickness. She states that she tolerates only some food, but has been unable to keep down the majority of food. Breakfast tray at bedside was nearly untouched. Pt states she had a few bites of an english muffin. Pt denies any recent changes in wt and states she is at her UBW. Pt confirms that she has been taking a prenatal vitamin at home. Appropriate vitamins in MAR.     Discussed with pt about meal services while admitted. Nutrition Representative is to see pt daily and help find foods that are tolerable. Spoke with Nutrition Representative about pt, stating that she will be more likely to tolerate bland items at this time. Encouraged snacks as tolerated. RD to follow for adequate intake.     Diet: Regular  Wt: 58.1 kg (128#) per stand up scale on admit  BMI: 20.06 (WNL)  Labs: reviewed  Meds: rocephin, unisom, vitamin B-6, prenatal plus vitamin  Fluids: NS @ 150 ml/hr  GI: last BM 1/1  Skin: no skin breakdown documented at this time    Plan/Recommend:  1. Encourage PO intake of meals   2. Nutrition Representative to see daily for meal preferences  3. Please record intake as percentage of meals consumed  4. RD to monitor PO intake, wt trends, and nutrition labs/meds

## 2018-01-02 NOTE — ASSESSMENT & PLAN NOTE
- on admission Sepsis 4/4 elevated, qSofa 1/3 (tachynpena). Lactic acid 1.2   - Urine cultures grew E. coli  - discharge on augmentin

## 2018-01-02 NOTE — ASSESSMENT & PLAN NOTE
- on admission Dysuria, bilateral flank pain, fever/chills, dysuria and bilateral CVA tenderness   - UA shows evidence of UTI, sepsis 4/4 SIRS criteria   - Cultures grew pansensitive E. coli, blood cultures NGTD  - Continue IV ceftriaxone, patient spiked another fever overnight and continues to have right CVA tenderness, renal ultrasound ordered and shows less than 1 cm stone in the left UPJ, no hydronephrosis

## 2018-01-02 NOTE — DISCHARGE PLANNING
No insurance on pt's facesheet. No Medicaid Found (). SW e-mailed [facility] requesting they screen pt for Medicaid.

## 2018-01-02 NOTE — H&P
Internal Medicine Admitting History and Physical    Note Author: Andry Payne M.D.       Name Marlen Jones       1995   Age/Sex 22 y.o. female   MRN [id number]   Code Status Full code      After 5PM or if no immediate response to page, please call for cross-coverage  Attending/Team: Dr Dorado/ Saul  See Patient List for primary contact information  Call [phone] to page    1st Call - Day Intern (R1):   Dr Poole  2nd Call - Day Sr. Resident (R2/R3):   Dr Villasenor        Chief Complaint:  Fever/ Headache/ flank pain/ nausea and vomiting     HPI:  Ms Jones is a 23y/o female patient with no past medical history. Patient presented to ED due to fever, headache, and flank pain since Friday. She is 9 weeks pregnant. Patient reported having fever, chills for the past 3 days. Patient has been taking tylenol with mild improvement on her symptoms. She also reported having 2-3 episodes of vomiting daily for the past 2 weeks, this is specially in the morning. In addition reported dysuria for a couple of days, denies changes in color of urine or blood. Also denies chest pain, palpitations, abdominal pain, or vaginal bleeding. Patient has history of pyelonephritis in the past.      In the ED patient was tachycardic and had fever and found with elevated WBC. Because of this patient received IV fluids and ceftriaxone. Patient admitted for further management.     Review of Systems   Constitutional: Positive for chills and fever. Negative for diaphoresis.   HENT: Negative for congestion.    Eyes: Negative for blurred vision and double vision.   Respiratory: Negative for cough, hemoptysis, sputum production and shortness of breath.    Cardiovascular: Negative for chest pain, palpitations and leg swelling.   Gastrointestinal: Positive for nausea and vomiting. Negative for abdominal pain, constipation and diarrhea.   Genitourinary: Positive for dysuria and flank pain. Negative for frequency, hematuria and  "urgency.   Musculoskeletal: Positive for back pain. Negative for falls and joint pain.   Skin: Negative for rash.   Neurological: Positive for headaches. Negative for dizziness, focal weakness, seizures, loss of consciousness and weakness.   Psychiatric/Behavioral: Negative for depression.             Past Medical History:   History reviewed. No pertinent past medical history.    Past Surgical History:  History reviewed. No pertinent surgical history.    Current Outpatient Medications:  Home Medications     Reviewed by Danna Dias, Pharmacy Intern (Pharmacy Intern) on 01/01/18 at 2043  Med List Status: Complete   Medication Last Dose Status   acetaminophen (TYLENOL) 500 MG Tab 1/1/2018 Active   Prenatal Vit-Fe Fumarate-FA (PRENATAL 19 PO) 12/31/2017 Active                Medication Allergy/Sensitivities:  Allergies   Allergen Reactions   • Nkda [No Known Drug Allergy]          Family History:  History reviewed. No pertinent family history.   Grandmother - DM   No history of Cancer     Social History:  Social History     Social History   • Marital status: Single     Spouse name: N/A   • Number of children: N/A   • Years of education: N/A     Occupational History   • Not on file.     Social History Main Topics   • Smoking status: Never Smoker   • Smokeless tobacco: Never Used   • Alcohol use No   • Drug use: No   • Sexual activity: Not Currently     Partners: Male      Comment: none     Other Topics Concern   • Not on file     Social History Narrative   • No narrative on file     Living situation: With family   PCP : States none       Physical Exam     Vitals:    01/01/18 1855 01/01/18 2023 01/01/18 2041 01/01/18 2101   BP: 106/69      Pulse: (!) 130 (!) 111  99   Resp: 20   18   Temp: (!) 38.4 °C (101.1 °F)  37.7 °C (99.8 °F)    SpO2:  97%  99%   Weight:       Height:         Body mass index is 20.06 kg/m².  /69   Pulse 99   Temp 37.7 °C (99.8 °F)   Resp 18   Ht 1.702 m (5' 7\")   Wt 58.1 kg (128 lb " 1.4 oz)   LMP 10/28/2017 (Exact Date)   SpO2 99%   BMI 20.06 kg/m²   O2 therapy: Pulse Oximetry: 99 %, O2 (LPM): 0, O2 Delivery: None (Room Air)    Physical Exam   Constitutional: She is oriented to person, place, and time and well-developed, well-nourished, and in no distress.  Non-toxic appearance.   HENT:   Head: Normocephalic and atraumatic.   Eyes: Conjunctivae and EOM are normal. Pupils are equal, round, and reactive to light. No scleral icterus.   Neck: Normal range of motion. Neck supple. No JVD present.   Cardiovascular: Regular rhythm and normal heart sounds.  Tachycardia present.    No murmur heard.  Pulmonary/Chest: Effort normal and breath sounds normal. No respiratory distress. She has no wheezes.   Abdominal: Soft. Bowel sounds are normal. There is no tenderness.   Musculoskeletal: Normal range of motion. She exhibits no edema.   Mild CVA tenderness on right side    Neurological: She is alert and oriented to person, place, and time. No cranial nerve deficit.   Skin: Skin is warm. No erythema.   Psychiatric: Affect normal.   Nursing note and vitals reviewed.            Data Review       Old Records Request:   Completed  Current Records review and summary: Completed    Lab Data Review:  Recent Results (from the past 24 hour(s))   CBC WITH DIFFERENTIAL    Collection Time: 01/01/18  2:38 PM   Result Value Ref Range    WBC 13.8 (H) 4.8 - 10.8 K/uL    RBC 4.20 4.20 - 5.40 M/uL    Hemoglobin 14.2 12.0 - 16.0 g/dL    Hematocrit 40.0 37.0 - 47.0 %    MCV 95.2 81.4 - 97.8 fL    MCH 33.8 (H) 27.0 - 33.0 pg    MCHC 35.5 (H) 33.6 - 35.0 g/dL    RDW 42.6 35.9 - 50.0 fL    Platelet Count 186 164 - 446 K/uL    MPV 9.2 9.0 - 12.9 fL    Neutrophils-Polys 85.00 (H) 44.00 - 72.00 %    Lymphocytes 6.70 (L) 22.00 - 41.00 %    Monocytes 7.80 0.00 - 13.40 %    Eosinophils 0.00 0.00 - 6.90 %    Basophils 0.10 0.00 - 1.80 %    Immature Granulocytes 0.40 0.00 - 0.90 %    Nucleated RBC 0.00 /100 WBC    Neutrophils (Absolute)  11.72 (H) 2.00 - 7.15 K/uL    Lymphs (Absolute) 0.92 (L) 1.00 - 4.80 K/uL    Monos (Absolute) 1.08 (H) 0.00 - 0.85 K/uL    Eos (Absolute) 0.00 0.00 - 0.51 K/uL    Baso (Absolute) 0.01 0.00 - 0.12 K/uL    Immature Granulocytes (abs) 0.05 0.00 - 0.11 K/uL    NRBC (Absolute) 0.00 K/uL   COMP METABOLIC PANEL    Collection Time: 01/01/18  2:38 PM   Result Value Ref Range    Sodium 129 (L) 135 - 145 mmol/L    Potassium 3.0 (L) 3.6 - 5.5 mmol/L    Chloride 100 96 - 112 mmol/L    Co2 21 20 - 33 mmol/L    Anion Gap 8.0 0.0 - 11.9    Glucose 99 65 - 99 mg/dL    Bun 6 (L) 8 - 22 mg/dL    Creatinine 0.57 0.50 - 1.40 mg/dL    Calcium 9.3 8.5 - 10.5 mg/dL    AST(SGOT) 17 12 - 45 U/L    ALT(SGPT) 18 2 - 50 U/L    Alkaline Phosphatase 55 30 - 99 U/L    Total Bilirubin 0.7 0.1 - 1.5 mg/dL    Albumin 4.3 3.2 - 4.9 g/dL    Total Protein 7.2 6.0 - 8.2 g/dL    Globulin 2.9 1.9 - 3.5 g/dL    A-G Ratio 1.5 g/dL   LIPASE    Collection Time: 01/01/18  2:38 PM   Result Value Ref Range    Lipase 10 (L) 11 - 82 U/L   URINALYSIS,CULTURE IF INDICATED    Collection Time: 01/01/18  2:38 PM   Result Value Ref Range    Color Yellow     Character Cloudy (A)     Specific Gravity 1.016 <1.035    Ph 5.5 5.0 - 8.0    Glucose Negative Negative mg/dL    Ketones 15 (A) Negative mg/dL    Protein 30 (A) Negative mg/dL    Bilirubin Negative Negative    Urobilinogen, Urine 2.0 Negative    Nitrite Positive (A) Negative    Leukocyte Esterase Moderate (A) Negative    Occult Blood Moderate (A) Negative    Micro Urine Req Microscopic     Culture Indicated Yes UA Culture   BETA-HCG QUALITATIVE SERUM    Collection Time: 01/01/18  2:38 PM   Result Value Ref Range    Beta-Hcg Qualitative Serum Positive (A) Negative   URINE MICROSCOPIC (W/UA)    Collection Time: 01/01/18  2:38 PM   Result Value Ref Range    WBC Packed WBC /hpf    RBC 10-20 (A) /hpf    Bacteria Many (A) None /hpf    Epithelial Cells Few /hpf    Hyaline Cast 3-5 (A) /lpf   ESTIMATED GFR    Collection Time:  01/01/18  2:38 PM   Result Value Ref Range    GFR If African American >60 >60 mL/min/1.73 m 2    GFR If Non African American >60 >60 mL/min/1.73 m 2   MAGNESIUM    Collection Time: 01/01/18  2:38 PM   Result Value Ref Range    Magnesium 2.0 1.5 - 2.5 mg/dL   PHOSPHORUS    Collection Time: 01/01/18  2:38 PM   Result Value Ref Range    Phosphorus 2.7 2.5 - 4.5 mg/dL   LACTIC ACID    Collection Time: 01/01/18  8:37 PM   Result Value Ref Range    Lactic Acid 1.2 0.5 - 2.0 mmol/L       Imaging/Procedures Review:    ndependant Imaging Review: Completed  US-OB LIMITED TRANSABDOMINAL   Final Result      1.  There is a single live IUP estimated at 8 weeks 5 days gestational age with an BRE of 8/8/2018 which is within one week of the clinical dates.   2.  Fetal heart rate is 195 BPM.   3.  The ovaries are within normal limits.             Records reviewed and summarized in current documentation             Assessment/Plan     * Sepsis (CMS-[facility])   Assessment & Plan    Sepsis 3/4 elevated WBC count (13.8), tachycardia and fever. Blood pressure within normal. Possible source UTI/ pyelonephritis   Lactic acid 1.2   Patient received 1 dose of ceftriaxone in the ED     Plan   Admitted to telemetry   IV fluids at 150ml/hr   Urine culture pending   Blood culture pending   Continue Ceftriaxone           Pyelonephritis affecting pregnancy in first trimester   Assessment & Plan    Patient complaining of dysuria for the past week, lower back pain bilateral, fever and chills.  UA showed signs of infection with + nitrates and leukocyte esterase   On physical exam mild CVA tenderness noted on right side     Plan   Continue Ceftriaxone IV   Urine culture - pending   Blood culture - pending           Pregnancy- (present on admission)   Assessment & Plan    Patient is 9 weeks pregnant   She is having multiple episodes of emesis daily. This could be secondary to pregnancy (hyperemeisis) vs pyelonephritis   Transvaginal US: There is a single  live IUP estimated at 8 weeks 5 days gestational age     Plan   Multivitamins daily   Thiamine 100mg IV once   Folic acid 1mg IV once   Transvaginal US - rule out ectopic pregnancy. There is a IUP         Nausea & vomiting   Assessment & Plan    Patient reported 2-3 episodes of vomiting daily for the past 3 weeks.   No blood noted.   This could be secondary to pregnancy (hyperemesis) vs pyelonephritis     Plan   Zofran PRN   Doxylamine 20mg / Pyridoxine 20mg at bedtime if able to tolerate PO        Hypokalemia   Assessment & Plan    Potassium of 3.0 on admission     Plan   Potassium 40mEq IV   Potassium 40mEq PO   CMP @ 3am   Replete as necessary   Check magnesium (2) and phos (2.7)         Hyponatremia   Assessment & Plan    Sodium 129 - asymptomatic     IV fluids   CMP @ 3am             Anticipated Hospital stay:  >2 midnights        Quality Measures    Reviewed items::  Radiology images reviewed, Labs reviewed, EKG reviewed and Medications reviewed  Navarro catheter::  No Navarro  DVT prophylaxis - mechanical:  SCDs

## 2018-01-03 ENCOUNTER — APPOINTMENT (OUTPATIENT)
Dept: RADIOLOGY | Facility: MEDICAL CENTER | Age: 23
DRG: 781 | End: 2018-01-03
Attending: STUDENT IN AN ORGANIZED HEALTH CARE EDUCATION/TRAINING PROGRAM
Payer: MEDICAID

## 2018-01-03 LAB
ALBUMIN SERPL BCP-MCNC: 3.4 G/DL (ref 3.2–4.9)
ALBUMIN/GLOB SERPL: 1.3 G/DL
ALP SERPL-CCNC: 49 U/L (ref 30–99)
ALT SERPL-CCNC: 15 U/L (ref 2–50)
ANION GAP SERPL CALC-SCNC: 7 MMOL/L (ref 0–11.9)
AST SERPL-CCNC: 14 U/L (ref 12–45)
BACTERIA UR CULT: ABNORMAL
BACTERIA UR CULT: ABNORMAL
BASOPHILS # BLD AUTO: 0.2 % (ref 0–1.8)
BASOPHILS # BLD: 0.02 K/UL (ref 0–0.12)
BILIRUB SERPL-MCNC: 0.6 MG/DL (ref 0.1–1.5)
BUN SERPL-MCNC: 3 MG/DL (ref 8–22)
CALCIUM SERPL-MCNC: 8.4 MG/DL (ref 8.5–10.5)
CHLORIDE SERPL-SCNC: 104 MMOL/L (ref 96–112)
CO2 SERPL-SCNC: 20 MMOL/L (ref 20–33)
CREAT SERPL-MCNC: 0.49 MG/DL (ref 0.5–1.4)
EOSINOPHIL # BLD AUTO: 0.01 K/UL (ref 0–0.51)
EOSINOPHIL NFR BLD: 0.1 % (ref 0–6.9)
ERYTHROCYTE [DISTWIDTH] IN BLOOD BY AUTOMATED COUNT: 43 FL (ref 35.9–50)
GFR SERPL CREATININE-BSD FRML MDRD: >60 ML/MIN/1.73 M 2
GLOBULIN SER CALC-MCNC: 2.6 G/DL (ref 1.9–3.5)
GLUCOSE SERPL-MCNC: 100 MG/DL (ref 65–99)
HCT VFR BLD AUTO: 35.3 % (ref 37–47)
HGB BLD-MCNC: 12.4 G/DL (ref 12–16)
IMM GRANULOCYTES # BLD AUTO: 0.08 K/UL (ref 0–0.11)
IMM GRANULOCYTES NFR BLD AUTO: 0.6 % (ref 0–0.9)
LYMPHOCYTES # BLD AUTO: 1.35 K/UL (ref 1–4.8)
LYMPHOCYTES NFR BLD: 10.9 % (ref 22–41)
MCH RBC QN AUTO: 33.6 PG (ref 27–33)
MCHC RBC AUTO-ENTMCNC: 35.1 G/DL (ref 33.6–35)
MCV RBC AUTO: 95.7 FL (ref 81.4–97.8)
MONOCYTES # BLD AUTO: 1.35 K/UL (ref 0–0.85)
MONOCYTES NFR BLD AUTO: 10.9 % (ref 0–13.4)
NEUTROPHILS # BLD AUTO: 9.61 K/UL (ref 2–7.15)
NEUTROPHILS NFR BLD: 77.3 % (ref 44–72)
NRBC # BLD AUTO: 0 K/UL
NRBC BLD-RTO: 0 /100 WBC
PLATELET # BLD AUTO: 153 K/UL (ref 164–446)
PMV BLD AUTO: 9.7 FL (ref 9–12.9)
POTASSIUM SERPL-SCNC: 3.2 MMOL/L (ref 3.6–5.5)
PROT SERPL-MCNC: 6 G/DL (ref 6–8.2)
RBC # BLD AUTO: 3.69 M/UL (ref 4.2–5.4)
SIGNIFICANT IND 70042: ABNORMAL
SITE SITE: ABNORMAL
SODIUM SERPL-SCNC: 131 MMOL/L (ref 135–145)
SOURCE SOURCE: ABNORMAL
WBC # BLD AUTO: 12.4 K/UL (ref 4.8–10.8)

## 2018-01-03 PROCEDURE — 700102 HCHG RX REV CODE 250 W/ 637 OVERRIDE(OP): Performed by: STUDENT IN AN ORGANIZED HEALTH CARE EDUCATION/TRAINING PROGRAM

## 2018-01-03 PROCEDURE — 700111 HCHG RX REV CODE 636 W/ 250 OVERRIDE (IP): Performed by: STUDENT IN AN ORGANIZED HEALTH CARE EDUCATION/TRAINING PROGRAM

## 2018-01-03 PROCEDURE — A9270 NON-COVERED ITEM OR SERVICE: HCPCS | Performed by: STUDENT IN AN ORGANIZED HEALTH CARE EDUCATION/TRAINING PROGRAM

## 2018-01-03 PROCEDURE — 36415 COLL VENOUS BLD VENIPUNCTURE: CPT

## 2018-01-03 PROCEDURE — 770020 HCHG ROOM/CARE - TELE (206)

## 2018-01-03 PROCEDURE — 76775 US EXAM ABDO BACK WALL LIM: CPT

## 2018-01-03 PROCEDURE — 99232 SBSQ HOSP IP/OBS MODERATE 35: CPT | Mod: GC | Performed by: INTERNAL MEDICINE

## 2018-01-03 PROCEDURE — 700105 HCHG RX REV CODE 258: Performed by: STUDENT IN AN ORGANIZED HEALTH CARE EDUCATION/TRAINING PROGRAM

## 2018-01-03 PROCEDURE — 85025 COMPLETE CBC W/AUTO DIFF WBC: CPT

## 2018-01-03 PROCEDURE — 80053 COMPREHEN METABOLIC PANEL: CPT

## 2018-01-03 RX ORDER — POTASSIUM CHLORIDE 20 MEQ/1
40 TABLET, EXTENDED RELEASE ORAL DAILY
Status: DISCONTINUED | OUTPATIENT
Start: 2018-01-03 | End: 2018-01-04 | Stop reason: HOSPADM

## 2018-01-03 RX ADMIN — SODIUM CHLORIDE: 9 INJECTION, SOLUTION INTRAVENOUS at 12:43

## 2018-01-03 RX ADMIN — MEROPENEM 500 MG: 500 INJECTION, POWDER, FOR SOLUTION INTRAVENOUS at 09:01

## 2018-01-03 RX ADMIN — CEFTRIAXONE 2 G: 2 INJECTION, POWDER, FOR SOLUTION INTRAMUSCULAR; INTRAVENOUS at 11:06

## 2018-01-03 RX ADMIN — ENOXAPARIN SODIUM 40 MG: 100 INJECTION SUBCUTANEOUS at 07:52

## 2018-01-03 RX ADMIN — Medication 25 MG: at 21:12

## 2018-01-03 RX ADMIN — ACETAMINOPHEN 650 MG: 325 TABLET, FILM COATED ORAL at 09:00

## 2018-01-03 RX ADMIN — Medication 1 TABLET: at 07:53

## 2018-01-03 RX ADMIN — SODIUM CHLORIDE: 9 INJECTION, SOLUTION INTRAVENOUS at 04:49

## 2018-01-03 RX ADMIN — DOXYLAMINE SUCCINATE 25 MG: 25 TABLET ORAL at 21:12

## 2018-01-03 RX ADMIN — SODIUM CHLORIDE: 9 INJECTION, SOLUTION INTRAVENOUS at 19:35

## 2018-01-03 RX ADMIN — POTASSIUM CHLORIDE 40 MEQ: 1500 TABLET, EXTENDED RELEASE ORAL at 07:53

## 2018-01-03 ASSESSMENT — ENCOUNTER SYMPTOMS
DIZZINESS: 0
CHILLS: 0
SORE THROAT: 0
ABDOMINAL PAIN: 0
NAUSEA: 0
DIAPHORESIS: 0
WEAKNESS: 0
GASTROINTESTINAL NEGATIVE: 1
HEADACHES: 0
PALPITATIONS: 0
FEVER: 0
CARDIOVASCULAR NEGATIVE: 1
CONSTITUTIONAL NEGATIVE: 1
COUGH: 0
VOMITING: 0
MYALGIAS: 0
BLURRED VISION: 0
HEARTBURN: 0
DIARRHEA: 0
RESPIRATORY NEGATIVE: 1
CONSTIPATION: 0
NEUROLOGICAL NEGATIVE: 1
EYES NEGATIVE: 1
SHORTNESS OF BREATH: 0
BRUISES/BLEEDS EASILY: 0
FALLS: 0
MUSCULOSKELETAL NEGATIVE: 1

## 2018-01-03 ASSESSMENT — PAIN SCALES - GENERAL
PAINLEVEL_OUTOF10: 0
PAINLEVEL_OUTOF10: 4
PAINLEVEL_OUTOF10: 0

## 2018-01-03 NOTE — PROGRESS NOTES
Internal Medicine Interval Note  Note Author: Rosalba Poole M.D.     Name Marlen Jones       1995   Age/Sex 22 y.o. female   MRN [id number]   Code Status Full     After 5PM or if no immediate response to page, please call for cross-coverage  Attending/Team: Ave/ kevyn See Patient List for primary contact information  Call [phone] to page    1st Call - Day Intern (R1):   Virgilio 2nd Call - Day Sr. Resident (R2/R3):   Jacklyn         Reason for interval visit  (Principal Problem)   Pyelonephritis affecting pregnancy in first trimester    Interval Problem Daily Status Update  (24 hours)   Patient states she feels a lot betterBut still has right CVA tenderness no longer complaining of nausea/vomiting or urinary symptoms.  She spiked a fever overnight, 101.2, urine cultures grew a pansensitive E. coli will continue with ceftriaxone. Ordered renal ultrasound due to recurrent fever and continued CVA tenderness, only significant for less than 1 cm stone in the left UPJ, no hydronephrosis.    Review of Systems   Constitutional: Negative.  Negative for chills, diaphoresis and fever.   HENT: Negative.  Negative for hearing loss and sore throat.    Eyes: Negative.  Negative for blurred vision.   Respiratory: Negative.  Negative for cough and shortness of breath.    Cardiovascular: Negative.  Negative for chest pain, palpitations and leg swelling.   Gastrointestinal: Negative.  Negative for abdominal pain, constipation, diarrhea, heartburn, nausea and vomiting.   Genitourinary: Negative.  Negative for dysuria, frequency, hematuria and urgency.   Musculoskeletal: Negative.  Negative for falls and myalgias.   Skin: Negative.  Negative for rash.   Neurological: Negative.  Negative for dizziness, weakness and headaches.   Endo/Heme/Allergies: Negative.  Does not bruise/bleed easily.       Consultants/Specialty  none    Disposition  Inpt    Quality Measures    Reviewed items::  EKG reviewed, Labs reviewed,  Medications reviewed and Radiology images reviewed  Navarro catheter::  No Navarro  DVT prophylaxis pharmacological::  Enoxaparin (Lovenox)  Ulcer Prophylaxis::  No          Physical Exam       Vitals:    01/03/18 0030 01/03/18 0355 01/03/18 0843 01/03/18 1226   BP: (!) 98/60 (!) 93/62 (!) 96/57 106/57   Pulse: 74 83 61 76   Resp: 20 18 16 18   Temp: 36.1 °C (97 °F) 36.1 °C (97 °F) 37.6 °C (99.7 °F) 36.6 °C (97.8 °F)   SpO2: 99% 100% 100% 100%   Weight:       Height:         Body mass index is 21.2 kg/m². Weight: 61.4 kg (135 lb 5.8 oz)  Oxygen Therapy:  Pulse Oximetry: 100 %, O2 (LPM): 0, O2 Delivery: None (Room Air)    Physical Exam   Constitutional: She is oriented to person, place, and time and well-developed, well-nourished, and in no distress. No distress.   HENT:   Head: Normocephalic and atraumatic.   Mouth/Throat: Oropharynx is clear and moist. No oropharyngeal exudate.   Eyes: Conjunctivae and EOM are normal. Pupils are equal, round, and reactive to light.   Neck: Normal range of motion. Neck supple.   Cardiovascular: Normal rate, regular rhythm and normal heart sounds.    No murmur heard.  Pulmonary/Chest: Effort normal and breath sounds normal. No respiratory distress. She has no wheezes. She has no rales.   Abdominal: Soft. Bowel sounds are normal. She exhibits no distension. There is no rebound and no guarding.   Right CVA tenderness.   Musculoskeletal: Normal range of motion. She exhibits no edema or tenderness.   Lymphadenopathy:     She has no cervical adenopathy.   Neurological: She is alert and oriented to person, place, and time. No cranial nerve deficit.   Skin: Skin is warm. She is not diaphoretic. No erythema.         Lab Data Review:         1/2/2018  2:19 PM    Recent Labs      01/01/18   1438  01/02/18   0312  01/03/18   0129   SODIUM  129*  135  131*   POTASSIUM  3.0*  4.0  3.2*   CHLORIDE  100  107  104   CO2  21  18*  20   BUN  6*  6*  3*   CREATININE  0.57  0.50  0.49*   MAGNESIUM  2.0   --     --    PHOSPHORUS  2.7   --    --    CALCIUM  9.3  9.0  8.4*       Recent Labs      18   1438  18   0312  18   0129   ALTSGPT  18  14  15   ASTSGOT  17  13  14   ALKPHOSPHAT  55  58  49   TBILIRUBIN  0.7  1.0  0.6   LIPASE  10*   --    --    GLUCOSE  99  90  100*       Recent Labs      18   1438  18   0312  18   0129   RBC  4.20  4.15*  3.69*   HEMOGLOBIN  14.2  13.6  12.4   HEMATOCRIT  40.0  39.0  35.3*   PLATELETCT  186  161*  153*       Recent Labs      18   1438  18   0312  18   0129   WBC  13.8*  14.4*  12.4*   NEUTSPOLYS  85.00*  85.30*  77.30*   LYMPHOCYTES  6.70*  5.20*  10.90*   MONOCYTES  7.80  8.80  10.90   EOSINOPHILS  0.00  0.00  0.10   BASOPHILS  0.10  0.10  0.20   ASTSGOT  17  13  14   ALTSGPT  18  14  15   ALKPHOSPHAT  55  58  49   TBILIRUBIN  0.7  1.0  0.6           Assessment/Plan     * Pyelonephritis affecting pregnancy in first trimester   Assessment & Plan    - on admission Dysuria, bilateral flank pain, fever/chills, dysuria and bilateral CVA tenderness   - UA shows evidence of UTI, sepsis 4/4 SIRS criteria   - Cultures grew pansensitive E. coli, blood cultures NGTD  - Continue IV ceftriaxone, patient spiked another fever overnight and continues to have right CVA tenderness, renal ultrasound ordered and shows less than 1 cm stone in the left UPJ, no hydronephrosis        Sepsis (CMS-HCC)- (present on admission)   Assessment & Plan    - on admission Sepsis 4/4 elevated, qSofa 1/3 (tachynpena). Lactic acid 1.2   - Urine cultures grew E. coli  - Continue IV fluids and ceftriaxone          Pregnancy- (present on admission)   Assessment & Plan    Patient , is 9 weeks pregnant.  She is having multiple episodes of emesis daily. This could be secondary to pregnancy (hyperemeisis) vs pyelonephritis   Transvaginal US: There is a single live IUP estimated at 8 weeks 5 days gestational age     Plan   Multivitamins daily   Transvaginal US - rule  out ectopic pregnancy. There is a IUP         Nausea & vomiting- (present on admission)   Assessment & Plan    - Patient reported 2-3 episodes of vomiting daily for the past 3 weeks, non bloody  - May be due to pregnancy, pain from pyelonephritis  - started on zofran, Doxylamine 20mg / Pyridoxine 20mg  - No longer complaining of nausea, will DC Zofran        Hypokalemia- (present on admission)   Assessment & Plan    - Most likely due to dehydration and vomiting, replete as necessary        Hyponatremia- (present on admission)   Assessment & Plan    - Most likely due to dehydration, repleted as necessary

## 2018-01-04 ENCOUNTER — PATIENT OUTREACH (OUTPATIENT)
Dept: HEALTH INFORMATION MANAGEMENT | Facility: OTHER | Age: 23
End: 2018-01-04

## 2018-01-04 VITALS
HEART RATE: 93 BPM | HEIGHT: 67 IN | WEIGHT: 134.26 LBS | OXYGEN SATURATION: 99 % | DIASTOLIC BLOOD PRESSURE: 59 MMHG | TEMPERATURE: 98.5 F | SYSTOLIC BLOOD PRESSURE: 95 MMHG | RESPIRATION RATE: 14 BRPM | BODY MASS INDEX: 21.07 KG/M2

## 2018-01-04 PROBLEM — N20.0 NEPHROLITHIASIS: Status: ACTIVE | Noted: 2018-01-04

## 2018-01-04 LAB
ALBUMIN SERPL BCP-MCNC: 3.4 G/DL (ref 3.2–4.9)
ALBUMIN/GLOB SERPL: 1.2 G/DL
ALP SERPL-CCNC: 52 U/L (ref 30–99)
ALT SERPL-CCNC: 21 U/L (ref 2–50)
ANION GAP SERPL CALC-SCNC: 7 MMOL/L (ref 0–11.9)
AST SERPL-CCNC: 19 U/L (ref 12–45)
BASOPHILS # BLD AUTO: 0.1 % (ref 0–1.8)
BASOPHILS # BLD: 0.01 K/UL (ref 0–0.12)
BILIRUB SERPL-MCNC: 0.3 MG/DL (ref 0.1–1.5)
BUN SERPL-MCNC: 4 MG/DL (ref 8–22)
CALCIUM SERPL-MCNC: 8.3 MG/DL (ref 8.5–10.5)
CHLORIDE SERPL-SCNC: 105 MMOL/L (ref 96–112)
CO2 SERPL-SCNC: 21 MMOL/L (ref 20–33)
CREAT SERPL-MCNC: 0.45 MG/DL (ref 0.5–1.4)
EOSINOPHIL # BLD AUTO: 0.03 K/UL (ref 0–0.51)
EOSINOPHIL NFR BLD: 0.3 % (ref 0–6.9)
ERYTHROCYTE [DISTWIDTH] IN BLOOD BY AUTOMATED COUNT: 44.2 FL (ref 35.9–50)
GFR SERPL CREATININE-BSD FRML MDRD: >60 ML/MIN/1.73 M 2
GLOBULIN SER CALC-MCNC: 2.8 G/DL (ref 1.9–3.5)
GLUCOSE SERPL-MCNC: 83 MG/DL (ref 65–99)
HCT VFR BLD AUTO: 36.4 % (ref 37–47)
HGB BLD-MCNC: 12.5 G/DL (ref 12–16)
IMM GRANULOCYTES # BLD AUTO: 0.04 K/UL (ref 0–0.11)
IMM GRANULOCYTES NFR BLD AUTO: 0.4 % (ref 0–0.9)
LYMPHOCYTES # BLD AUTO: 1.39 K/UL (ref 1–4.8)
LYMPHOCYTES NFR BLD: 13.3 % (ref 22–41)
MCH RBC QN AUTO: 33.1 PG (ref 27–33)
MCHC RBC AUTO-ENTMCNC: 34.3 G/DL (ref 33.6–35)
MCV RBC AUTO: 96.3 FL (ref 81.4–97.8)
MONOCYTES # BLD AUTO: 0.74 K/UL (ref 0–0.85)
MONOCYTES NFR BLD AUTO: 7.1 % (ref 0–13.4)
NEUTROPHILS # BLD AUTO: 8.24 K/UL (ref 2–7.15)
NEUTROPHILS NFR BLD: 78.8 % (ref 44–72)
NRBC # BLD AUTO: 0 K/UL
NRBC BLD-RTO: 0 /100 WBC
PLATELET # BLD AUTO: 186 K/UL (ref 164–446)
PMV BLD AUTO: 9.7 FL (ref 9–12.9)
POTASSIUM SERPL-SCNC: 3.5 MMOL/L (ref 3.6–5.5)
PROT SERPL-MCNC: 6.2 G/DL (ref 6–8.2)
RBC # BLD AUTO: 3.78 M/UL (ref 4.2–5.4)
SODIUM SERPL-SCNC: 133 MMOL/L (ref 135–145)
WBC # BLD AUTO: 10.5 K/UL (ref 4.8–10.8)

## 2018-01-04 PROCEDURE — 700111 HCHG RX REV CODE 636 W/ 250 OVERRIDE (IP): Performed by: STUDENT IN AN ORGANIZED HEALTH CARE EDUCATION/TRAINING PROGRAM

## 2018-01-04 PROCEDURE — A9270 NON-COVERED ITEM OR SERVICE: HCPCS | Performed by: STUDENT IN AN ORGANIZED HEALTH CARE EDUCATION/TRAINING PROGRAM

## 2018-01-04 PROCEDURE — 85025 COMPLETE CBC W/AUTO DIFF WBC: CPT

## 2018-01-04 PROCEDURE — 700102 HCHG RX REV CODE 250 W/ 637 OVERRIDE(OP): Performed by: STUDENT IN AN ORGANIZED HEALTH CARE EDUCATION/TRAINING PROGRAM

## 2018-01-04 PROCEDURE — 36415 COLL VENOUS BLD VENIPUNCTURE: CPT

## 2018-01-04 PROCEDURE — 700105 HCHG RX REV CODE 258: Performed by: STUDENT IN AN ORGANIZED HEALTH CARE EDUCATION/TRAINING PROGRAM

## 2018-01-04 PROCEDURE — 80053 COMPREHEN METABOLIC PANEL: CPT

## 2018-01-04 PROCEDURE — 99239 HOSP IP/OBS DSCHRG MGMT >30: CPT | Mod: GC | Performed by: INTERNAL MEDICINE

## 2018-01-04 RX ORDER — PYRIDOXINE HCL (VITAMIN B6) 25 MG
25 TABLET ORAL EVERY EVENING
Qty: 30 TAB | Refills: 0 | Status: SHIPPED | OUTPATIENT
Start: 2018-01-04 | End: 2022-10-04

## 2018-01-04 RX ORDER — FOLIC ACID 1 MG/1
1 TABLET ORAL DAILY
Qty: 30 TAB | Refills: 0 | Status: SHIPPED | OUTPATIENT
Start: 2018-01-04 | End: 2022-10-04

## 2018-01-04 RX ORDER — AMOXICILLIN AND CLAVULANATE POTASSIUM 875; 125 MG/1; MG/1
1 TABLET, FILM COATED ORAL 2 TIMES DAILY
Qty: 20 TAB | Refills: 0 | Status: SHIPPED | OUTPATIENT
Start: 2018-01-04 | End: 2018-01-14

## 2018-01-04 RX ORDER — TAMSULOSIN HYDROCHLORIDE 0.4 MG/1
0.4 CAPSULE ORAL DAILY
Qty: 60 CAP | Refills: 0 | Status: SHIPPED | OUTPATIENT
Start: 2018-01-04 | End: 2022-10-04

## 2018-01-04 RX ADMIN — SODIUM CHLORIDE: 9 INJECTION, SOLUTION INTRAVENOUS at 07:56

## 2018-01-04 RX ADMIN — SODIUM CHLORIDE: 9 INJECTION, SOLUTION INTRAVENOUS at 02:22

## 2018-01-04 RX ADMIN — POTASSIUM CHLORIDE 40 MEQ: 1500 TABLET, EXTENDED RELEASE ORAL at 07:57

## 2018-01-04 RX ADMIN — ENOXAPARIN SODIUM 40 MG: 100 INJECTION SUBCUTANEOUS at 07:57

## 2018-01-04 RX ADMIN — CEFTRIAXONE 2 G: 2 INJECTION, POWDER, FOR SOLUTION INTRAMUSCULAR; INTRAVENOUS at 07:57

## 2018-01-04 RX ADMIN — Medication 1 TABLET: at 07:57

## 2018-01-04 ASSESSMENT — PAIN SCALES - GENERAL
PAINLEVEL_OUTOF10: 0

## 2018-01-04 NOTE — PROGRESS NOTES
Assumed pt care at 1900, bedside report received.  Pt in no distress.  Pt denies any chest pain or SOB.  No further complaints.  Bed locked in lowest position and call light within reach.  Will continue to monitor and follow plan of care.

## 2018-01-04 NOTE — DISCHARGE INSTRUCTIONS
Discharge Instructions    Discharged to home by car with relative. Discharged via walking, hospital escort: Refused.  Special equipment needed: Not Applicable    Be sure to schedule a follow-up appointment with your primary care doctor or any specialists as instructed.     Discharge Plan:   Diet Plan: Discussed  Activity Level: Discussed  Confirmed Follow up Appointment: Appointment Scheduled  Confirmed Symptoms Management: Discussed  Medication Reconciliation Updated: Yes  Influenza Vaccine Indication: Indicated: 9 to 64 years of age  Influenza Vaccine Given - only chart on this line when given: Influenza Vaccine Given (See MAR)    I understand that a diet low in cholesterol, fat, and sodium is recommended for good health. Unless I have been given specific instructions below for another diet, I accept this instruction as my diet prescription.   Other diet: Regular     Special Instructions: Sepsis, Adult  Sepsis is a serious infection of your blood or tissues that affects your whole body. The infection that causes sepsis may be bacterial, viral, fungal, or parasitic. Sepsis may be life threatening. Sepsis can cause your blood pressure to drop. This may result in shock. Shock causes your central nervous system and your organs to stop working correctly.   RISK FACTORS  Sepsis can happen in anyone, but it is more likely to happen in people who have weakened immune systems.  SIGNS AND SYMPTOMS   Symptoms of sepsis can include:  · Fever or low body temperature (hypothermia).  · Rapid breathing (hyperventilation).  · Chills.  · Rapid heartbeat (tachycardia).  · Confusion or light-headedness.  · Trouble breathing.  · Urinating much less than usual.  · Cool, clammy skin or red, flushed skin.  · Other problems with the heart, kidneys, or brain.  DIAGNOSIS   Your health care provider will likely do tests to look for an infection, to see if the infection has spread to your blood, and to see how serious your condition is. Tests  can include:  · Blood tests, including cultures of your blood.  · Cultures of other fluids from your body, such as:  ¨ Urine.  ¨ Pus from wounds.  ¨ Mucus coughed up from your lungs.  · Urine tests other than cultures.  · X-ray exams or other imaging tests.  TREATMENT   Treatment will begin with elimination of the source of infection. If your sepsis is likely caused by a bacterial or fungal infection, you will be given antibiotic or antifungal medicines.  You may also receive:  · Oxygen.  · Fluids through an IV tube.  · Medicines to increase your blood pressure.  · A machine to clean your blood (dialysis) if your kidneys fail.  · A machine to help you breathe if your lungs fail.  SEEK IMMEDIATE MEDICAL CARE IF:  You get an infection or develop any of the signs and symptoms of sepsis after surgery or a hospitalization.     This information is not intended to replace advice given to you by your health care provider. Make sure you discuss any questions you have with your health care provider.     Document Released: 09/15/2004 Document Revised: 05/03/2016 Document Reviewed: 08/25/2014  [employer] Interactive Patient Education ©2016 [employer]      · Is patient discharged on Warfarin / Coumadin?   No     · Is patient Post Blood Transfusion?  No    Depression / Suicide Risk    As you are discharged from this [facility] facility, it is important to learn how to keep safe from harming yourself.    Recognize the warning signs:  · Abrupt changes in personality, positive or negative- including increase in energy   · Giving away possessions  · Change in eating patterns- significant weight changes-  positive or negative  · Change in sleeping patterns- unable to sleep or sleeping all the time   · Unwillingness or inability to communicate  · Depression  · Unusual sadness, discouragement and loneliness  · Talk of wanting to die  · Neglect of personal appearance   · Rebelliousness- reckless behavior  · Withdrawal from  people/activities they love  · Confusion- inability to concentrate     If you or a loved one observes any of these behaviors or has concerns about self-harm, here's what you can do:  · Talk about it- your feelings and reasons for harming yourself  · Remove any means that you might use to hurt yourself (examples: pills, rope, extension cords, firearm)  · Get professional help from the community (Mental Health, Substance Abuse, psychological counseling)  · Do not be alone:Call your Safe Contact- someone whom you trust who will be there for you.  · Call your local CRISIS HOTLINE 526-2971 or 905-961-0348  · Call your local Children's Mobile Crisis Response Team [place] (241) 633-7251 or www.Pennant  · Call the toll free National Suicide Prevention Hotlines   · National Suicide Prevention Lifeline 749-710-AXSR (3908)  · [employer] Network 800-SUICIDE (656-2123)      Pyelonephritis, Adult  Pyelonephritis is a kidney infection. A kidney infection can happen quickly, or it can last for a long time.  HOME CARE   · Take your medicine (antibiotics) as told. Finish it even if you start to feel better.  · Keep all doctor visits as told.  · Drink enough fluids to keep your pee (urine) clear or pale yellow.  · Only take medicine as told by your doctor.  GET HELP RIGHT AWAY IF:   · You have a fever or lasting symptoms for more than 2-3 days.  · You have a fever and your symptoms suddenly get worse.  · You cannot take your medicine or drink fluids as told.  · You have chills and shaking.  · You feel very weak or pass out (faint).  · You do not feel better after 2 days.  MAKE SURE YOU:  · Understand these instructions.  · Will watch your condition.  · Will get help right away if you are not doing well or get worse.     This information is not intended to replace advice given to you by your health care provider. Make sure you discuss any questions you have with your health care provider.     Document Released:  01/25/2006 Document Revised: 01/08/2016 Document Reviewed: 06/06/2012  [employer] Interactive Patient Education ©2016 [employer]    Hypokalemia  Hypokalemia means that the amount of potassium in the blood is lower than normal. Potassium is a chemical, called an electrolyte, that helps regulate the amount of fluid in the body. It also stimulates muscle contraction and helps nerves function properly. Most of the body's potassium is inside of cells, and only a very small amount is in the blood. Because the amount in the blood is so small, minor changes can be life-threatening.  CAUSES  · Antibiotics.  · Diarrhea or vomiting.  · Using laxatives too much, which can cause diarrhea.  · Chronic kidney disease.  · Water pills (diuretics).  · Eating disorders (bulimia).  · Low magnesium level.  · Sweating a lot.  SIGNS AND SYMPTOMS  · Weakness.  · Constipation.  · Fatigue.  · Muscle cramps.  · Mental confusion.  · Skipped heartbeats or irregular heartbeat (palpitations).  · Tingling or numbness.  DIAGNOSIS   Your health care provider can diagnose hypokalemia with blood tests. In addition to checking your potassium level, your health care provider may also check other lab tests.  TREATMENT  Hypokalemia can be treated with potassium supplements taken by mouth or adjustments in your current medicines. If your potassium level is very low, you may need to get potassium through a vein (IV) and be monitored in the hospital. A diet high in potassium is also helpful. Foods high in potassium are:  · Nuts, such as peanuts and pistachios.  · Seeds, such as sunflower seeds and pumpkin seeds.  · Peas, lentils, and lima beans.  · Whole grain and bran cereals and breads.  · Fresh fruit and vegetables, such as apricots, avocado, bananas, cantaloupe, kiwi, oranges, tomatoes, asparagus, and potatoes.  · Orange and tomato juices.  · Red meats.  · Fruit yogurt.  HOME CARE INSTRUCTIONS  · Take all medicines as prescribed by your health care  provider.  · Maintain a healthy diet by including nutritious food, such as fruits, vegetables, nuts, whole grains, and lean meats.  · If you are taking a laxative, be sure to follow the directions on the label.  SEEK MEDICAL CARE IF:  · Your weakness gets worse.  · You feel your heart pounding or racing.  · You are vomiting or having diarrhea.  · You are diabetic and having trouble keeping your blood glucose in the normal range.  SEEK IMMEDIATE MEDICAL CARE IF:  · You have chest pain, shortness of breath, or dizziness.  · You are vomiting or having diarrhea for more than 2 days.  · You faint.  MAKE SURE YOU:   · Understand these instructions.  · Will watch your condition.  · Will get help right away if you are not doing well or get worse.     This information is not intended to replace advice given to you by your health care provider. Make sure you discuss any questions you have with your health care provider.     Document Released: 12/18/2006 Document Revised: 01/08/2016 Document Reviewed: 06/20/2014  [employer] Interactive Patient Education ©2016 [employer]

## 2018-01-04 NOTE — DISCHARGE SUMMARY
"        Internal Medicine Discharge Summary  Note Author: Rosalba Poole M.D.       Admit Date:  1/1/2018       Discharge Date:   1/4/2018    Service:   [facility] Internal Medicine White Team  Attending Physician(s):   Dr. Dorado       Senior Resident(s):   Dr. Villasenor  Britton Resident(s):   Dr. Poole       Primary Diagnosis:      Pyelonephritis affecting pregnancy in first trimester  POA: Yes      Sepsis secondary to pyelonephritis  POA: Yes     Secondary Diagnoses:                Principal Problem:      Pyelonephritis affecting pregnancy in first trimester POA: Yes       Sepsis secondary to pyelonephritis  POA: Yes    Active Problems:    Pregnancy POA: Yes      Hyponatremia POA: Yes    Hypokalemia POA: Yes      Resolved Problems:    Sepsis secondary to pyelonephritis  POA: Yes    Nausea & vomiting POA: Yes      Hospital Summary (Brief Narrative):       22-year-old female who is 9 weeks pregnant with no significant past medical history comes in due to flank pain, dysuria, subjective fever since 3 days. She denies cough, shortness of breath, chest pain, abdominal pain, hematuria. UA in the ED consistent with UTI, she has SIRS 4/4. She was admitted and started on IV fluids and ceftriaxone. Urine cultures grew pansensitive E. Coli, ceftriaxone was continued. She continued to have right CVA tenderness so renal ultrasound was done which showed \"a 0.6x 0.5 x 0.4 cm stone seen in the left UPJ area\". Urology, Dr Arellano, was called and recommended she follow up with them as outpatient in 1 month with renal ultrasound to start on Flomax. WBC trended down, patient no longer febrile, CVA tenderness minimal, she wanted to go home.    Patient was discharged on Augmentin, Flomax. Risks and benefits of antibiotics discussed with patient, she understands and agrees with therapy. Patient instructed to follow-up with primary care, ObGyn, urology.    Patient /Hospital Summary (Details -- Problem Oriented) :          Sepsis (CMS-[facility]) "   Assessment & Plan    - on admission Sepsis 4/4 elevated, qSofa 1/3 (tachynpena). Lactic acid 1.2   - Urine cultures grew E. coli  - discharge on augmentin        Pregnancy   Assessment & Plan    Patient , is 9 weeks pregnant.  She is having multiple episodes of emesis daily. This could be secondary to pregnancy (hyperemeisis) vs pyelonephritis   Transvaginal US: There is a single live IUP estimated at 8 weeks 5 days gestational age     Plan   Multivitamins daily   Transvaginal US - rule out ectopic pregnancy. There is a IUP         * Pyelonephritis affecting pregnancy in first trimester   Assessment & Plan    - on admission Dysuria, bilateral flank pain, fever/chills, dysuria and bilateral CVA tenderness   - UA shows evidence of UTI, sepsis 4/4 SIRS criteria   - Cultures grew pansensitive E. coli, blood cultures NGTD  - Continue IV ceftriaxone, patient spiked another fever overnight and continues to have right CVA tenderness, renal ultrasound ordered and shows less than 1 cm stone in the left UPJ, no hydronephrosis        Nephrolithiasis   Assessment & Plan    US renal showed a 0.6x 0.5 x 0.4 cm stone seen in the left UPJ area, spoke with urology and recommends follow up in 1 month.  contacted to schedule an appointment.         Nausea & vomiting   Assessment & Plan    - Patient reported 2-3 episodes of vomiting daily for the past 3 weeks, non bloody  - May be due to pregnancy, pain from pyelonephritis  - started on zofran, Doxylamine 20mg / Pyridoxine 20mg  - d/c on doxylamine/pyridoxine        Hypokalemia   Assessment & Plan    - Most likely due to dehydration and vomiting, replete as necessary        Hyponatremia   Assessment & Plan    - Most likely due to dehydration, repleted as necessary            Consultants:     None    Procedures:        none    Imaging/ Testing:      US-RENAL   Final Result         There is a 0.6x 0.5 x 0.4 cm stone seen in the left UPJ area. No hydronephrosis seen.          US-OB LIMITED TRANSABDOMINAL   Final Result      1.  There is a single live IUP estimated at 8 weeks 5 days gestational age with an BRE of 8/8/2018 which is within one week of the clinical dates.   2.  Fetal heart rate is 195 BPM.   3.  The ovaries are within normal limits.            Discharge Medications:         Medication Reconciliation: Completed       Medication List      START taking these medications      Instructions   amoxicillin-clavulanate 875-125 MG Tabs  Commonly known as:  AUGMENTIN   Take 1 Tab by mouth 2 times a day for 10 days.  Dose:  1 Tab     doxylamine 25 MG Tabs tablet  Commonly known as:  UNISOM   Take 1 Tab by mouth every evening.  Dose:  25 mg     folic acid 1 MG Tabs  Commonly known as:  FOLVITE   Take 1 Tab by mouth every day.  Dose:  1 mg     pyridoxine 25 MG Tabs  Commonly known as:  VITAMIN B-6   Take 1 Tab by mouth every evening.  Dose:  25 mg     tamsulosin 0.4 MG capsule  Commonly known as:  FLOMAX   Take 1 Cap by mouth every day.  Dose:  0.4 mg        CONTINUE taking these medications      Instructions   acetaminophen 500 MG Tabs  Commonly known as:  TYLENOL   Take 1,000 mg by mouth every 6 hours as needed for Moderate Pain.  Dose:  1000 mg     PRENATAL 19 PO   Take 1 Tab by mouth every day.  Dose:  1 Tab            Can use .DISCHARGEMEDSLIST if going to another facility         Disposition:   home    Diet:   regualr    Activity:   As tolerated    Instructions:         The patient was instructed to return to the ER in the event of worsening symptoms. I have counseled the patient on the importance of compliance and the patient has agreed to proceed with all medical recommendations and follow up plan indicated above.   The patient understands that all medications come with benefits and risks. Risks may include permanent injury or death and these risks can be minimized with close reassessment and monitoring.        Primary Care Provider:    Pcp Pt States None  Discharge summary  faxed to primary care provider:  Deferred  Copy of discharge summary given to the patient: Deferred      Follow up appointment details :      PCP in 2 weeks after completing abx.   Ob as scheduled.  Urology 1 month.    Pending Studies:        none    Time spent on discharge day patient visit, preparing discharge paperwork and arranging for patient follow up.    Summary of follow up issues:   UA after completion of abx  US renal in month, prior to seeing urologist    Discharge Time (Minutes) :    60min  Hospital Course Type:  Inpatient Stay >2 midnights      Condition on Discharge    ______________________________________________________________________    Interval history/exam for day of discharge:     Denies SOB, abdominal pain, nausea/vomiting, urinary symptoms. CVA tenderness improved today. Spoke with pt about discharge and plan of care, she understands and agrees.    Vitals:    01/03/18 2355 01/04/18 0357 01/04/18 0804 01/04/18 1200   BP: (!) 99/56 101/61 (!) 93/64 (!) 96/65   Pulse: 84 93 90 76   Resp: 16 16 16 14   Temp: 37.4 °C (99.3 °F) 37.6 °C (99.7 °F) 36.6 °C (97.9 °F) 36.9 °C (98.5 °F)   SpO2: 98% 96% 99% 97%   Weight:       Height:         Weight/BMI: Body mass index is 21.03 kg/m².  Pulse Oximetry: 97 %, O2 (LPM): 0, O2 Delivery: None (Room Air)    General: AOx3, NAD  CVS: RRR, no murmurs  PULM: CTAB, no wheezing, rales  ABD: BS+. Soft, non tender, non distended, mild R CVA tenderness    Most Recent Labs:    Lab Results   Component Value Date/Time    WBC 10.5 01/04/2018 02:42 AM    RBC 3.78 (L) 01/04/2018 02:42 AM    HEMOGLOBIN 12.5 01/04/2018 02:42 AM    HEMATOCRIT 36.4 (L) 01/04/2018 02:42 AM    MCV 96.3 01/04/2018 02:42 AM    MCH 33.1 (H) 01/04/2018 02:42 AM    MCHC 34.3 01/04/2018 02:42 AM    MPV 9.7 01/04/2018 02:42 AM    NEUTSPOLYS 78.80 (H) 01/04/2018 02:42 AM    LYMPHOCYTES 13.30 (L) 01/04/2018 02:42 AM    MONOCYTES 7.10 01/04/2018 02:42 AM    EOSINOPHILS 0.30 01/04/2018 02:42 AM    BASOPHILS  0.10 01/04/2018 02:42 AM      Lab Results   Component Value Date/Time    SODIUM 133 (L) 01/04/2018 02:42 AM    POTASSIUM 3.5 (L) 01/04/2018 02:42 AM    CHLORIDE 105 01/04/2018 02:42 AM    CO2 21 01/04/2018 02:42 AM    GLUCOSE 83 01/04/2018 02:42 AM    BUN 4 (L) 01/04/2018 02:42 AM    CREATININE 0.45 (L) 01/04/2018 02:42 AM      Lab Results   Component Value Date/Time    ALTSGPT 21 01/04/2018 02:42 AM    ASTSGOT 19 01/04/2018 02:42 AM    ALKPHOSPHAT 52 01/04/2018 02:42 AM    TBILIRUBIN 0.3 01/04/2018 02:42 AM    LIPASE 10 (L) 01/01/2018 02:38 PM    ALBUMIN 3.4 01/04/2018 02:42 AM    GLOBULIN 2.8 01/04/2018 02:42 AM     No results found for: PROTHROMBTM, INR

## 2018-01-04 NOTE — ASSESSMENT & PLAN NOTE
US renal showed a 0.6x 0.5 x 0.4 cm stone seen in the left UPJ area, spoke with urology and recommends follow up in 1 month.  contacted to schedule an appointment.

## 2018-01-05 NOTE — PROGRESS NOTES
Patient brought to [facility] via WC by Marlyn Jamison RN. Patient discharged home. IV removed, tip intact. Tele box removed, returned, and monitor room aware. Discharge instruction with appointment information given to patient. Understands importance to follow up. Hard copy of outpatient renal US and UA provided for patient.

## 2018-01-06 LAB
BACTERIA BLD CULT: NORMAL
BACTERIA BLD CULT: NORMAL
SIGNIFICANT IND 70042: NORMAL
SIGNIFICANT IND 70042: NORMAL
SITE SITE: NORMAL
SITE SITE: NORMAL
SOURCE SOURCE: NORMAL
SOURCE SOURCE: NORMAL

## 2018-03-19 ENCOUNTER — APPOINTMENT (OUTPATIENT)
Dept: RADIOLOGY | Facility: MEDICAL CENTER | Age: 23
End: 2018-03-19
Attending: FAMILY MEDICINE
Payer: MEDICAID

## 2018-03-19 ENCOUNTER — HOSPITAL ENCOUNTER (OUTPATIENT)
Facility: MEDICAL CENTER | Age: 23
End: 2018-03-20
Admitting: FAMILY MEDICINE
Payer: MEDICAID

## 2018-03-19 ENCOUNTER — HOSPITAL ENCOUNTER (OUTPATIENT)
Facility: MEDICAL CENTER | Age: 23
End: 2018-03-19
Payer: MEDICAID

## 2018-03-19 VITALS
HEART RATE: 96 BPM | SYSTOLIC BLOOD PRESSURE: 107 MMHG | HEIGHT: 66 IN | BODY MASS INDEX: 22.34 KG/M2 | DIASTOLIC BLOOD PRESSURE: 72 MMHG | WEIGHT: 139 LBS | RESPIRATION RATE: 16 BRPM | TEMPERATURE: 98.6 F

## 2018-03-19 VITALS
DIASTOLIC BLOOD PRESSURE: 57 MMHG | RESPIRATION RATE: 18 BRPM | TEMPERATURE: 98.4 F | HEART RATE: 85 BPM | SYSTOLIC BLOOD PRESSURE: 99 MMHG

## 2018-03-19 LAB
APPEARANCE UR: ABNORMAL
COLOR UR AUTO: ABNORMAL
GLUCOSE UR QL STRIP.AUTO: NEGATIVE MG/DL
KETONES UR QL STRIP.AUTO: 40 MG/DL
LEUKOCYTE ESTERASE UR QL STRIP.AUTO: ABNORMAL
NITRITE UR QL STRIP.AUTO: POSITIVE
PH UR STRIP.AUTO: 6.5 [PH]
PROT UR QL STRIP: 100 MG/DL
RBC UR QL AUTO: ABNORMAL
SP GR UR: 1.02

## 2018-03-19 PROCEDURE — A9270 NON-COVERED ITEM OR SERVICE: HCPCS | Performed by: FAMILY MEDICINE

## 2018-03-19 PROCEDURE — 87077 CULTURE AEROBIC IDENTIFY: CPT

## 2018-03-19 PROCEDURE — 87086 URINE CULTURE/COLONY COUNT: CPT

## 2018-03-19 PROCEDURE — 87186 SC STD MICRODIL/AGAR DIL: CPT

## 2018-03-19 PROCEDURE — 76775 US EXAM ABDO BACK WALL LIM: CPT

## 2018-03-19 PROCEDURE — 81002 URINALYSIS NONAUTO W/O SCOPE: CPT

## 2018-03-19 PROCEDURE — 700102 HCHG RX REV CODE 250 W/ 637 OVERRIDE(OP): Performed by: FAMILY MEDICINE

## 2018-03-19 PROCEDURE — 700105 HCHG RX REV CODE 258: Performed by: FAMILY MEDICINE

## 2018-03-19 RX ORDER — ACETAMINOPHEN 325 MG/1
650 TABLET ORAL ONCE
Status: COMPLETED | OUTPATIENT
Start: 2018-03-19 | End: 2018-03-19

## 2018-03-19 RX ORDER — SODIUM CHLORIDE, SODIUM LACTATE, POTASSIUM CHLORIDE, CALCIUM CHLORIDE 600; 310; 30; 20 MG/100ML; MG/100ML; MG/100ML; MG/100ML
1000 INJECTION, SOLUTION INTRAVENOUS ONCE
Status: COMPLETED | OUTPATIENT
Start: 2018-03-19 | End: 2018-03-20

## 2018-03-19 RX ADMIN — SODIUM CHLORIDE, POTASSIUM CHLORIDE, SODIUM LACTATE AND CALCIUM CHLORIDE 1000 ML: 600; 310; 30; 20 INJECTION, SOLUTION INTRAVENOUS at 21:47

## 2018-03-19 RX ADMIN — ACETAMINOPHEN 650 MG: 325 TABLET, FILM COATED ORAL at 21:46

## 2018-03-19 ASSESSMENT — PAIN SCALES - GENERAL: PAINLEVEL_OUTOF10: 10

## 2018-03-19 NOTE — PROGRESS NOTES
[facility] LABOR AND DELIVERY TRIAGE PROGRESS NOTE    PATIENT ID:  NAME:  Marlen England  MRN:               [id number]  YOB: 1995     22 y.o. female  at 20w2d.    Subjective: Pt presents with abdominal and R flank pain onset this morning.  She has a history of pyelonephritis in January and she states that this feels similar to early in the course of the pyelonephritis.  She denies any fevers, n/v, f/c, d/c.  She admits to dysuria and urinary frequency.  Her pain is constant, she denies lof, bleeding, ctxs.  She has no allergies.    Pregnancy complicated by pyelonephritis    negative  For CTXS.   positive Feels pain   negative for LOF  negative for vaginal bleeding.   positive for fetal movement    ROS: Patient denies any fever chills, nausea, vomiting, headache, chest pain, shortness of breath, or dysuria or unusual swelling of hands or feet.     Objective:    Vitals:    18 1457   BP: (!) 99/57   Pulse: 85   Resp: 18   Temp: 36.9 °C (98.4 °F)     Temp (24hrs), Av.9 °C (98.4 °F), Min:36.9 °C (98.4 °F), Max:36.9 °C (98.4 °F)    General: No acute distress, resting comfortably in bed, nontoxic.  HEENT: normocephalic, nontraumatic, PERRL, nonicteric, EOMI  Cardiovascular: Heart RRR with no murmurs, rubs or gallops. Distal Pulses 2+  Respiratory: symmetric chest expansion, lungs CTAB, with no wheezes, rales, rhonci  Abdomen: gravid, nontender, + R CVA tenderness which is mild  Musculoskeletal: ambulatory with normal gait  Neuro: MAAT, CN III - XI grossly intact by passive exam    [place]: FHR 150s    UA: positive nitrites    Review of prior records: hx of pan sensitive e coli pyelonephritis      Assessment: 22 y.o. female  at 20w2d with cystitis and possibly early pyelonephritis.    Plan:   1. Patient given strict return precautions and advised to f/u at [facility] clinic tomorrow.  Rx for cefdinir 10 day course given. Advised acetaminophen for pain/fever.      Discussed case with    Bienvenido

## 2018-03-19 NOTE — PROGRESS NOTES
1450-pt presents from home with c/o back pain, frequency, burning and urgency with urination, no c/o leaking, bleeding, or uc's, states that she is feeling some movement with baby, FHR dopplered at 150's, TOCO applied, ua dipped (see results)  1500-TC Dr Hernandez, report given, will be in to assess pt  1515-Dr Hernandez here, assessment done, will discharge pt with antibiotics for UTC, urine sent for culture  1535-pt discharged home with antibiotics and instructions for UTI, verbalized understanding, left ambulatory with SO

## 2018-03-20 PROCEDURE — 700111 HCHG RX REV CODE 636 W/ 250 OVERRIDE (IP): Performed by: FAMILY MEDICINE

## 2018-03-20 RX ADMIN — CEFTRIAXONE SODIUM 1 G: 10 INJECTION, POWDER, FOR SOLUTION INTRAVENOUS at 00:15

## 2018-03-20 ASSESSMENT — PAIN SCALES - GENERAL: PAINLEVEL_OUTOF10: 0

## 2018-03-20 NOTE — PROGRESS NOTES
"[facility] AND DELIVERY TRIAGE PROGRESS NOTE    PATIENT ID:  NAME:  Marlen England  MRN:               [id number]  YOB: 1995     22 y.o. female  at 20w3d per reported EDC of 18.    Subjective: Pt presents to OB triage for the second time today complaining of right flank pain, dysuria, urgency, and increased frequency X 5 days. Patient was seen this morning, UA was consistent with UTI, and patient was discharged to complete a 10-day course of cefdinir. Patient reports her flank pain worsened throughout the day prompting her to return to triage. She denies fever, chills, nausea, vomiting, or diarrhea. She reports positive fetal movement, no vaginal bleeding, no contractions, and no loss of fluids.     Patient was treated for pyelonephritis in January. Pregnancy otherwise uncomplicated per patient.    negative  For CTXS.   positive Feels pain   negative for LOF  negative for vaginal bleeding.   positive for fetal movement    ROS: Patient denies any fever chills, nausea, vomiting, headache, chest pain, shortness of breath, or dysuria or unusual swelling of hands or feet.     Objective:    Vitals:    18 2053 18 2101   BP: 107/72    Pulse: 96    Resp: 16    Temp: 37.5 °C (99.5 °F) 37 °C (98.6 °F)   TempSrc: Temporal Oral   Weight: 63 kg (139 lb)    Height: 1.676 m (5' 6\")      Temp (24hrs), Av.3 °C (99.1 °F), Min:37 °C (98.6 °F), Max:37.5 °C (99.5 °F)    General: No acute distress, resting comfortably in bed.  HEENT: normocephalic, nontraumatic, PERRLA, EOMI  Cardiovascular: Heart RRR with no murmurs, rubs or gallops. Distal Pulses 2+  Respiratory: symmetric chest expansion, lungs CTAB, with no wheezes, rales, rhonci  Abdomen: gravid, nontender  Musculoskeletal: strength 5/5 in four extremities  Neuro: non focal with no numbness, tingling or changes in sensation    Cervix: Deferred  [place]: No contractions  FHRM: Baseline 140s    UA:  Positive nitrite  Moderate LE  Packed " WBC  10-20 RBCs  Many bacteria    Renal US:  New mild right hydronephrosis.  Previous stone in the left UPJ is not seen. No left hydronephrosis.  Debris in the urinary bladder. Correlate with UA.    Assessment: 22 y.o. female  at 20w3d with reported EDC of 18 with urinary tract infection, suspicious for ascending infection and pyelonephritis. Due to level of pain on presentation there was concern for possible nephrolithiasis however renal ultrasound was negative for stones. Patient received an NS 1L bolus, tylenol 650mg PO, ceftriaxone g IVP. Patient reported complete resolution of pain following above treatment, denies nausea and vomiting, remained afebrile and requested to return home with follow up at [facility] today at schedule appointment. Urine was sent for culture and sensitivities.    Plan:   Patient was discharged home with strict return precautions including right flank pain, fever, or vomiting, decreased fetal movement, vaginal bleeding, or LOF. Patient counseled on finishing course of cefdinir and following up at [facility] clinic today at pre-scheduled appointment.     Discussed case with Dr. Faria who agrees with plan of care.

## 2018-03-20 NOTE — PROGRESS NOTES
22 y.o. , EDC 8=20w2d    Pt presents to LDA c/o ctx q 2-3 minutes in her lower abdomen and R flank pain 10/10. Pt was seen in triage this afternoon and d/c with cefdinir rx. Urine was sent for culture. Pt reports pain so strong she couldn't walk so came back to unit. VSS, doppler 140s, +FM, no c/o LOF/VB. [place] applied. Pt was also treated for pyelonephritis in January. Call to Dr. Serra, orders received for IV hydration and Tylenol     Dr. Serra to bedside, renal u/s ordered    U/S to bedside, pt had been sleeping at this time    Spoke with Dr. Serra, orders for IV abx and overnight observation    After U/S left, pt now reports 0/10 pain, no tenderness. Able to walk to restroom, smiling. Call to Dr. Serra, orders to still give one dose abx and will come to assess pt     Dr. Serra to bedside, pt requesting to go home, d/c order received     D/c instructions d/w pt and FOB, express understanding, pt has pre-scheduled appointment today at the [facility] clinic, d/c ambulatory off unit

## 2018-03-21 LAB
BACTERIA UR CULT: ABNORMAL
BACTERIA UR CULT: ABNORMAL
SIGNIFICANT IND 70042: ABNORMAL
SITE SITE: ABNORMAL
SOURCE SOURCE: ABNORMAL

## 2018-07-14 ENCOUNTER — HOSPITAL ENCOUNTER (OUTPATIENT)
Facility: MEDICAL CENTER | Age: 23
End: 2018-07-14
Payer: MEDICAID

## 2018-07-14 VITALS — BODY MASS INDEX: 24.48 KG/M2 | WEIGHT: 156 LBS | HEIGHT: 67 IN

## 2018-07-14 LAB
A1 MICROGLOB PLACENTAL VAG QL: NEGATIVE
APPEARANCE UR: CLEAR
COLOR UR AUTO: YELLOW
CRYSTALS AMN MICRO: NORMAL
GLUCOSE UR QL STRIP.AUTO: NEGATIVE MG/DL
KETONES UR QL STRIP.AUTO: NEGATIVE MG/DL
LEUKOCYTE ESTERASE UR QL STRIP.AUTO: ABNORMAL
NITRITE UR QL STRIP.AUTO: NEGATIVE
PH UR STRIP.AUTO: 7 [PH]
PROT UR QL STRIP: NEGATIVE MG/DL
RBC UR QL AUTO: NEGATIVE
SP GR UR: 1.01

## 2018-07-14 PROCEDURE — 89060 EXAM SYNOVIAL FLUID CRYSTALS: CPT

## 2018-07-14 PROCEDURE — 59025 FETAL NON-STRESS TEST: CPT | Performed by: FAMILY MEDICINE

## 2018-07-14 PROCEDURE — 81002 URINALYSIS NONAUTO W/O SCOPE: CPT

## 2018-07-14 PROCEDURE — 84112 EVAL AMNIOTIC FLUID PROTEIN: CPT

## 2018-07-14 NOTE — PROGRESS NOTES
"[facility] LABOR AND DELIVERY TRIAGE NOTE    PATIENT ID:  NAME:  Marlen England  MRN:               [id number]  YOB: 1995    Subjective:   Presents complaining of contraction starting this morning . She is also questioning whether her water has broken. She states that contractions are uncomfortable, but able to talk and function through them. Reports good fetal movement. Denies bleeding, headache, abdominal pain, or edema.    No results for input(s): WBC, RBC, HEMOGLOBIN, HEMATOCRIT, MCV, MCH, RDW, PLATELETCT, MPV, NEUTSPOLYS, LYMPHOCYTES, MONOCYTES, EOSINOPHILS, BASOPHILS, RBCMORPHOLO in the last 72 hours.  No results for input(s): SODIUM, POTASSIUM, CHLORIDE, CO2, GLUCOSE, BUN, CPKTOTAL in the last 72 hours.      PHYSICAL EXAM:  Vitals:    18 1142   Weight: 70.8 kg (156 lb)   Height: 1.702 m (5' 7\")     General: No acute distress, resting comfortably in bed.  HEENT: normocephalic, nontraumatic, PERRLA, EOMI  Cardiovascular: Heart RRR with no murmurs, rubs or gallops. Distal Pulses 2+  Respiratory: symmetric chest expansion, lungs CTA bilaterally with no wheezes rales or rhonci  Abdomen: gravid, nontender  Musculoskeletal: strength 5/5 in four extremities  Neuro: non focal with no numbness, tingling or changes in sensation    SVE: 1/thick  [place]: Q4-10 minutes; EFM: 130 with accels to 160 and moderate variability    A/P:   22 yo  at 37w0d. Presents with intermittent contractions, but not in labor at this time. Fern and amni-sure are both negative. She is advised regarding return precautions and is otherwise scheduled for outpatient follow-up this week.    "

## 2018-07-14 NOTE — PROGRESS NOTES
Patient comes in with complaints that she had small amount of leaking fluid, she denies bleeding, feels fetal movement.  She feels occasional contractions that are mild.    Sterile speculum shows white vaginal discharge. Fern sent, negative.  SVE is 1/thick/high, no presenting parts felt.  Dr Breen notified.      0699 Dr Breen ordered amnisure.  Amnisure negative.  Patient to be discharged.  Discharge instructions given.  Patient ambulated out.

## 2018-08-04 ENCOUNTER — HOSPITAL ENCOUNTER (INPATIENT)
Facility: MEDICAL CENTER | Age: 23
LOS: 1 days | End: 2018-08-05
Admitting: FAMILY MEDICINE
Payer: MEDICAID

## 2018-08-04 LAB
BASOPHILS # BLD AUTO: 0.2 % (ref 0–1.8)
BASOPHILS # BLD: 0.02 K/UL (ref 0–0.12)
EOSINOPHIL # BLD AUTO: 0.02 K/UL (ref 0–0.51)
EOSINOPHIL NFR BLD: 0.2 % (ref 0–6.9)
ERYTHROCYTE [DISTWIDTH] IN BLOOD BY AUTOMATED COUNT: 49.8 FL (ref 35.9–50)
HCT VFR BLD AUTO: 43 % (ref 37–47)
HGB BLD-MCNC: 14.6 G/DL (ref 12–16)
HOLDING TUBE BB 8507: NORMAL
IMM GRANULOCYTES # BLD AUTO: 0.06 K/UL (ref 0–0.11)
IMM GRANULOCYTES NFR BLD AUTO: 0.6 % (ref 0–0.9)
LYMPHOCYTES # BLD AUTO: 1.86 K/UL (ref 1–4.8)
LYMPHOCYTES NFR BLD: 19.1 % (ref 22–41)
MCH RBC QN AUTO: 33.6 PG (ref 27–33)
MCHC RBC AUTO-ENTMCNC: 34 G/DL (ref 33.6–35)
MCV RBC AUTO: 98.9 FL (ref 81.4–97.8)
MONOCYTES # BLD AUTO: 0.77 K/UL (ref 0–0.85)
MONOCYTES NFR BLD AUTO: 7.9 % (ref 0–13.4)
NEUTROPHILS # BLD AUTO: 7.03 K/UL (ref 2–7.15)
NEUTROPHILS NFR BLD: 72 % (ref 44–72)
NRBC # BLD AUTO: 0 K/UL
NRBC BLD-RTO: 0 /100 WBC
PLATELET # BLD AUTO: 150 K/UL (ref 164–446)
PMV BLD AUTO: 10.1 FL (ref 9–12.9)
RBC # BLD AUTO: 4.35 M/UL (ref 4.2–5.4)
WBC # BLD AUTO: 9.8 K/UL (ref 4.8–10.8)

## 2018-08-04 PROCEDURE — 304965 HCHG RECOVERY SERVICES

## 2018-08-04 PROCEDURE — 700111 HCHG RX REV CODE 636 W/ 250 OVERRIDE (IP)

## 2018-08-04 PROCEDURE — A9270 NON-COVERED ITEM OR SERVICE: HCPCS | Performed by: FAMILY MEDICINE

## 2018-08-04 PROCEDURE — 700105 HCHG RX REV CODE 258

## 2018-08-04 PROCEDURE — 36415 COLL VENOUS BLD VENIPUNCTURE: CPT

## 2018-08-04 PROCEDURE — 10H07YZ INSERTION OF OTHER DEVICE INTO PRODUCTS OF CONCEPTION, VIA NATURAL OR ARTIFICIAL OPENING: ICD-10-PCS | Performed by: FAMILY MEDICINE

## 2018-08-04 PROCEDURE — 770002 HCHG ROOM/CARE - OB PRIVATE (112)

## 2018-08-04 PROCEDURE — 700105 HCHG RX REV CODE 258: Performed by: STUDENT IN AN ORGANIZED HEALTH CARE EDUCATION/TRAINING PROGRAM

## 2018-08-04 PROCEDURE — 10907ZC DRAINAGE OF AMNIOTIC FLUID, THERAPEUTIC FROM PRODUCTS OF CONCEPTION, VIA NATURAL OR ARTIFICIAL OPENING: ICD-10-PCS | Performed by: FAMILY MEDICINE

## 2018-08-04 PROCEDURE — 700111 HCHG RX REV CODE 636 W/ 250 OVERRIDE (IP): Performed by: STUDENT IN AN ORGANIZED HEALTH CARE EDUCATION/TRAINING PROGRAM

## 2018-08-04 PROCEDURE — 303615 HCHG EPIDURAL/SPINAL ANESTHESIA FOR LABOR

## 2018-08-04 PROCEDURE — 59409 OBSTETRICAL CARE: CPT

## 2018-08-04 PROCEDURE — 85025 COMPLETE CBC W/AUTO DIFF WBC: CPT

## 2018-08-04 PROCEDURE — 700102 HCHG RX REV CODE 250 W/ 637 OVERRIDE(OP): Performed by: FAMILY MEDICINE

## 2018-08-04 RX ORDER — BISACODYL 10 MG
10 SUPPOSITORY, RECTAL RECTAL PRN
Status: DISCONTINUED | OUTPATIENT
Start: 2018-08-04 | End: 2018-08-05 | Stop reason: HOSPADM

## 2018-08-04 RX ORDER — DOCUSATE SODIUM 100 MG/1
100 CAPSULE, LIQUID FILLED ORAL 2 TIMES DAILY PRN
Status: DISCONTINUED | OUTPATIENT
Start: 2018-08-04 | End: 2018-08-05 | Stop reason: HOSPADM

## 2018-08-04 RX ORDER — SODIUM CHLORIDE, SODIUM LACTATE, POTASSIUM CHLORIDE, CALCIUM CHLORIDE 600; 310; 30; 20 MG/100ML; MG/100ML; MG/100ML; MG/100ML
INJECTION, SOLUTION INTRAVENOUS PRN
Status: DISCONTINUED | OUTPATIENT
Start: 2018-08-04 | End: 2018-08-05 | Stop reason: HOSPADM

## 2018-08-04 RX ORDER — ROPIVACAINE HYDROCHLORIDE 2 MG/ML
INJECTION, SOLUTION EPIDURAL; INFILTRATION; PERINEURAL
Status: COMPLETED
Start: 2018-08-04 | End: 2018-08-04

## 2018-08-04 RX ORDER — MISOPROSTOL 200 UG/1
600 TABLET ORAL
Status: DISCONTINUED | OUTPATIENT
Start: 2018-08-04 | End: 2018-08-05 | Stop reason: HOSPADM

## 2018-08-04 RX ORDER — SODIUM CHLORIDE, SODIUM LACTATE, POTASSIUM CHLORIDE, CALCIUM CHLORIDE 600; 310; 30; 20 MG/100ML; MG/100ML; MG/100ML; MG/100ML
INJECTION, SOLUTION INTRAVENOUS CONTINUOUS
Status: DISPENSED | OUTPATIENT
Start: 2018-08-04 | End: 2018-08-04

## 2018-08-04 RX ORDER — HYDROCODONE BITARTRATE AND ACETAMINOPHEN 5; 325 MG/1; MG/1
1-2 TABLET ORAL EVERY 6 HOURS PRN
Status: DISCONTINUED | OUTPATIENT
Start: 2018-08-04 | End: 2018-08-05 | Stop reason: HOSPADM

## 2018-08-04 RX ORDER — SODIUM CHLORIDE, SODIUM LACTATE, POTASSIUM CHLORIDE, CALCIUM CHLORIDE 600; 310; 30; 20 MG/100ML; MG/100ML; MG/100ML; MG/100ML
INJECTION, SOLUTION INTRAVENOUS
Status: COMPLETED
Start: 2018-08-04 | End: 2018-08-04

## 2018-08-04 RX ORDER — BUPIVACAINE HYDROCHLORIDE 2.5 MG/ML
INJECTION, SOLUTION EPIDURAL; INFILTRATION; INTRACAUDAL
Status: ACTIVE
Start: 2018-08-04 | End: 2018-08-05

## 2018-08-04 RX ADMIN — Medication 125 ML/HR: at 20:13

## 2018-08-04 RX ADMIN — FENTANYL CITRATE 100 MCG: 50 INJECTION, SOLUTION INTRAMUSCULAR; INTRAVENOUS at 15:55

## 2018-08-04 RX ADMIN — Medication 2 MILLI-UNITS/MIN: at 15:13

## 2018-08-04 RX ADMIN — ROPIVACAINE HYDROCHLORIDE 100 ML: 2 INJECTION, SOLUTION EPIDURAL; INFILTRATION at 17:39

## 2018-08-04 RX ADMIN — SODIUM CHLORIDE, POTASSIUM CHLORIDE, SODIUM LACTATE AND CALCIUM CHLORIDE: 600; 310; 30; 20 INJECTION, SOLUTION INTRAVENOUS at 17:43

## 2018-08-04 RX ADMIN — HYDROCODONE BITARTRATE AND ACETAMINOPHEN 2 TABLET: 5; 325 TABLET ORAL at 20:12

## 2018-08-04 RX ADMIN — SODIUM CHLORIDE, POTASSIUM CHLORIDE, SODIUM LACTATE AND CALCIUM CHLORIDE: 600; 310; 30; 20 INJECTION, SOLUTION INTRAVENOUS at 10:02

## 2018-08-04 RX ADMIN — SODIUM CHLORIDE, POTASSIUM CHLORIDE, SODIUM LACTATE AND CALCIUM CHLORIDE: 600; 310; 30; 20 INJECTION, SOLUTION INTRAVENOUS at 17:00

## 2018-08-04 ASSESSMENT — PATIENT HEALTH QUESTIONNAIRE - PHQ9
2. FEELING DOWN, DEPRESSED, IRRITABLE, OR HOPELESS: NOT AT ALL
SUM OF ALL RESPONSES TO PHQ9 QUESTIONS 1 AND 2: 0
1. LITTLE INTEREST OR PLEASURE IN DOING THINGS: NOT AT ALL
1. LITTLE INTEREST OR PLEASURE IN DOING THINGS: NOT AT ALL
SUM OF ALL RESPONSES TO PHQ9 QUESTIONS 1 AND 2: 0
2. FEELING DOWN, DEPRESSED, IRRITABLE, OR HOPELESS: NOT AT ALL

## 2018-08-04 ASSESSMENT — LIFESTYLE VARIABLES
EVER_SMOKED: NEVER
ALCOHOL_USE: NO

## 2018-08-04 ASSESSMENT — PAIN SCALES - GENERAL
PAINLEVEL_OUTOF10: 7
PAINLEVEL_OUTOF10: 4

## 2018-08-04 NOTE — PROGRESS NOTES
24 y/o , EDC 18, EGA 40, here to room 224 with FOB. Pt here for ctx every 5 minutes. EFM/TOCO placed on patient, +FM noted. Denies vaginal LOF/bleeding.     0812) SVE /-2  0830) Page sent out to Dr. Duran  0952) PIV started, labs drawn and sent down, patient complaining of more pressure with contractions, SVE -/80/-2  1010) Dr. Duran at bedside to assess patient, SVE 5-/80/-2  1150) Dr. Nogueira at bedside to see patient, SVE 80/-2  1220) Dr. Duffy at bedside to assess patient, patient denies any needs or complaints at this time, safety precautions in place, CLIP  1300) Dr. Nogueira at bedside, patient complaining of constant pressure, SVE 80/-1  1442) Dr. Nogueira and Dr. Duffy at bedside, AROM clear fluid, IUPC placed by Dr. Duffy, SVE /-1  1513) Pitocin started per MD, patient educated concerning pitocin augmentation and verbalizes understanding  1650) Pt states she would like an epidural but is feeling more pressure constantly, SVE C/-1, Dr. Duffy and Dr. Nogueira update  1727) Dr. Quiles at bedside for epidural placement  1733) Test dose administered with no adverse reaction  1820) Dr. Nogueira at bedside, SVE C/+1  1830) Pushing with patient  1900) Bedside report to ANA Talavera RN, POC discussed at bedside, pushing with Dr. Nogueira and Dr. Duffy at bedside

## 2018-08-04 NOTE — CARE PLAN
Problem: Pain  Goal: Alleviation of Pain or a reduction in pain to the patient's comfort goal  Assessing and monitoring patient for s/s of pain and implementing pharmacologic and nonpharmacologic means of pain management as needed.    Problem: Risk for Infection, Impaired Wound Healing  Goal: Remain free from signs and symptoms of infection  Assessing and monitoring patient for s/s of infection and implementing precautions as needed. Educating patient and family concerning the importance of hand hygiene

## 2018-08-04 NOTE — PROGRESS NOTES
S: Pt feeling more pressure now. No LOF.    O:   SVE 8/80/-2  [place]: ctx q 5 min  EFM: 135, accels to 155    A/P  AROM, clear fluid at 2:45 pm  IUPC placed  Monitor ctx, start pit if not sufficient

## 2018-08-04 NOTE — H&P
History and Physical      Marlen England is a 23 y.o. year old female  at 40w0d with an BRE of 18 via LMP consistent with 8w US who presents for contractions. Patient was evaluated in clinic yesterday and was dilated to 3cm. She also has been having painful contractions. She reported to triage today due to the frequent painful contractions and was found to be dilated to 4cm. She was admitted for labor.  Of note, patient had pyelonephritis during her pregnancy for which she came to [facility]. She was treated. Her culture did not grow GBS.  PNL's neg  GBSneg  Mom O+, antibody screen neg  UCx negative  GC/CT negative    Subjective:   positive  For CTXS.   positive Feels pain   negative for LOF  negative for vaginal bleeding.   positive for fetal movement    ROS: Denies HA, SOB, N, V, Diarrhea, new swelling in her hands or feet    History reviewed. No pertinent past medical history.  History reviewed. No pertinent surgical history.  OB History    Para Term  AB Living   2 1 1     1   SAB TAB Ectopic Molar Multiple Live Births             1      # Outcome Date GA Lbr Akin/2nd Weight Sex Delivery Anes PTL Lv   2 Current            1 Term 13 40w1d  3.246 kg (7 lb 2.5 oz) M Vag-Spont None  ERIC      Birth Comments: pt. states no complications        Social History     Social History   • Marital status: Single     Spouse name: N/A   • Number of children: N/A   • Years of education: N/A     Occupational History   • Not on file.     Social History Main Topics   • Smoking status: Never Smoker   • Smokeless tobacco: Never Used   • Alcohol use No   • Drug use: No   • Sexual activity: Not Currently     Partners: Male      Comment: none     Other Topics Concern   • Not on file     Social History Narrative   • No narrative on file     Allergies: Nkda [no known drug allergy]    Current Facility-Administered Medications:   •  oxytocin (PITOCIN) 20 UNITS/1000ML LR, , , ,   •  LR infusion, ,  "Intravenous, Continuous, Aga Duran M.D., Last Rate: 125 mL/hr at 08/04/18 1002  •  fentaNYL (SUBLIMAZE) injection 50 mcg, 50 mcg, Intravenous, Q HOUR PRN, Aga Duran M.D.  •  fentaNYL (SUBLIMAZE) injection 100 mcg, 100 mcg, Intravenous, Q HOUR PRN, Aga Duran M.D.      Patient Active Problem List    Diagnosis Date Noted   • Sepsis (HCC) 01/01/2018     Priority: High   • Pyelonephritis affecting pregnancy in first trimester 01/01/2018     Priority: High   • Pregnancy 10/17/2012     Priority: High   • Nephrolithiasis 01/04/2018   • Hyponatremia 01/01/2018   • Hypokalemia 01/01/2018   • Nausea & vomiting 01/01/2018               Objective:      Blood pressure 112/71, pulse 77, temperature 36.3 °C (97.4 °F), temperature source Temporal, height 1.689 m (5' 6.5\"), weight 70.8 kg (156 lb), last menstrual period 10/28/2017.    General:   no acute distress   Skin:   normal   HEENT:  EOMI   Lungs:   CTA bilateral   Heart:   S1, S2 normal, no murmur, click, rub or gallop, regular rate and rhythm, brisk carotid upstroke without bruits, peripheral pulses very brisk, chest is clear without rales or wheezing, no pedal edema, no JVD, no hepatosplenomegaly   Abdomen:   gravid, NT   EFW:  3500g   Pelvis:  adequate with gynecoid pelvis   FHT:  140 BPM   Uterine Size:    Presentations: Cephalic   Cervix:     Dilation: 5-6cm    Effacement: 80%    Station:  -1    Consistency: Soft    Position: Anterior     Lab Review  Lab:   Blood type: O        Assessment:   Marlen England at 40w0d. Presents in labor. GBS neg.  Cat 1FHT  5-6cm/80%/-1  Cephalic presentation  Leo Q3-5 minutes.  Does not desire epidural.    Labor status: Early latent labor.  Obstetrical history significant for   Patient Active Problem List    Diagnosis Date Noted   • Sepsis (HCC) 01/01/2018     Priority: High   • Pyelonephritis affecting pregnancy in first trimester 01/01/2018     Priority: High   • Pregnancy 10/17/2012 "     Priority: High   • Nephrolithiasis 01/04/2018   • Hyponatremia 01/01/2018   • Hypokalemia 01/01/2018   • Nausea & vomiting 01/01/2018   .      Plan:     Admit to L&D  IVF, NPO  Fentanyl for pain  GBS negative  Monitor

## 2018-08-05 VITALS
RESPIRATION RATE: 16 BRPM | TEMPERATURE: 98.2 F | BODY MASS INDEX: 24.48 KG/M2 | SYSTOLIC BLOOD PRESSURE: 98 MMHG | OXYGEN SATURATION: 98 % | DIASTOLIC BLOOD PRESSURE: 67 MMHG | WEIGHT: 156 LBS | HEART RATE: 85 BPM | HEIGHT: 67 IN

## 2018-08-05 LAB
ERYTHROCYTE [DISTWIDTH] IN BLOOD BY AUTOMATED COUNT: 47.8 FL (ref 35.9–50)
HCT VFR BLD AUTO: 35.6 % (ref 37–47)
HGB BLD-MCNC: 12.6 G/DL (ref 12–16)
MCH RBC QN AUTO: 34.7 PG (ref 27–33)
MCHC RBC AUTO-ENTMCNC: 35.4 G/DL (ref 33.6–35)
MCV RBC AUTO: 98.1 FL (ref 81.4–97.8)
PLATELET # BLD AUTO: 162 K/UL (ref 164–446)
PMV BLD AUTO: 10.1 FL (ref 9–12.9)
RBC # BLD AUTO: 3.63 M/UL (ref 4.2–5.4)
WBC # BLD AUTO: 14.4 K/UL (ref 4.8–10.8)

## 2018-08-05 PROCEDURE — 700102 HCHG RX REV CODE 250 W/ 637 OVERRIDE(OP): Performed by: STUDENT IN AN ORGANIZED HEALTH CARE EDUCATION/TRAINING PROGRAM

## 2018-08-05 PROCEDURE — 36415 COLL VENOUS BLD VENIPUNCTURE: CPT

## 2018-08-05 PROCEDURE — A9270 NON-COVERED ITEM OR SERVICE: HCPCS | Performed by: STUDENT IN AN ORGANIZED HEALTH CARE EDUCATION/TRAINING PROGRAM

## 2018-08-05 PROCEDURE — 85027 COMPLETE CBC AUTOMATED: CPT

## 2018-08-05 RX ORDER — IBUPROFEN 600 MG/1
600 TABLET ORAL EVERY 6 HOURS PRN
Qty: 30 TAB | Refills: 3 | Status: SHIPPED | OUTPATIENT
Start: 2018-08-05 | End: 2018-08-15

## 2018-08-05 RX ORDER — IBUPROFEN 600 MG/1
600 TABLET ORAL EVERY 6 HOURS PRN
Status: DISCONTINUED | OUTPATIENT
Start: 2018-08-05 | End: 2018-08-05 | Stop reason: HOSPADM

## 2018-08-05 RX ADMIN — IBUPROFEN 600 MG: 600 TABLET, FILM COATED ORAL at 01:18

## 2018-08-05 RX ADMIN — IBUPROFEN 600 MG: 600 TABLET, FILM COATED ORAL at 10:13

## 2018-08-05 ASSESSMENT — PAIN SCALES - GENERAL
PAINLEVEL_OUTOF10: 7
PAINLEVEL_OUTOF10: 3
PAINLEVEL_OUTOF10: 2
PAINLEVEL_OUTOF10: 0

## 2018-08-05 NOTE — PROGRESS NOTES
[facility] SPONTANEOUS VAGINAL DELIVERY PRODEDURE NOTE    PATIENT ID:  NAME:  Marlen England  MRN:               [id number]  YOB: 1995    On 2018  at 1932, this 23 y.o., now  40w0d , GBS neg female delivered via  under epidural a viable female infant weighing 7 lbs and 1 oz with APGAR scores of 8 and 9 at one and five minutes. There was no nuchal cord/There was a single nuchal cord which was reduced and infant was bulb suctioned at delivery. Cord was doubly clamped, cut and infant handed to RN in attendance. An intact placenta was delivered spontaneously at 1937 with 3 vessel cord. Upon vaginal exam, there was a 1st degree laceration which did not require repair. Estimated blood loss was 250cc. Patient will be transferred to postpartum in stable condition and infant to  nursery.    Delivery attended by Dr. Duffy who was present for the entire delivery.

## 2018-08-05 NOTE — PROGRESS NOTES
Patient received from labor and delivery to S336. Bedside report received from Tiera MILLS L&D , assumed care of patient.  Patient oriented to room and surroundings, call system, skylight education channel, visiting policy, infant security including ID bands, not letting anyone take infant without photo ID, 0-10 pain scale and pain management discussed. Patient denies any pain and any needs at this time.  Patient instructed to call for assistance as needed. Assessment done.

## 2018-08-05 NOTE — DISCHARGE INSTRUCTIONS
POSTPARTUM DISCHARGE INSTRUCTIONS FOR MOM    YOB: 1995   Age: 23 y.o.               Admit Date: 2018     Discharge Date: 2018  Attending Doctor:  Laureano Hamilton                  Allergies:  Nkda [no known drug allergy]    Discharged to home by car. Discharged via wheelchair, hospital escort: Yes.  Special equipment needed: Not Applicable  Belongings with: Personal  Be sure to schedule a follow-up appointment with your primary care doctor or any specialists as instructed.     Discharge Plan:   Diet Plan: Discussed  Activity Level: Discussed  Confirmed Follow up Appointment: Appointment Scheduled  Medication Reconciliation Updated: Yes  Influenza Vaccine Indication: Patient Refuses, Not indicated: Previously immunized this influenza season and > 8 years of age    REASONS TO CALL YOUR OBSTETRICIAN:  1.   Persistent fever or shaking chills (Temperature higher than 100.4)  2.   Heavy bleeding (soaking more than 1 pad per hour); Passing clots  3.   Foul odor from vagina  4.   Mastitis (Breast infection; breast pain, chills, fever, redness)  5.   Urinary pain, burning or frequency  6.   Episiotomy infection  7.   Abdominal incision infection  8.   Severe depression longer than 24 hours    HAND WASHING  · Prior to handling the baby.  · Before breastfeeding or bottle feeding baby.  · After using the bathroom or changing the baby's diaper.    WOUND CARE  Ask your physician for additional care instructions.  In general:    ·  Incision:      · Keep clean and dry.    · Do NOT lift anything heavier than your baby for up to 6 weeks.    · There should not be any opening or pus.      VAGINAL CARE  · Nothing inside vagina for 6 weeks: no sexual intercourse, tampons or douching.  · Bleeding may continue for 2-4 weeks.  Amount may vary.    · Call your physician for heavy bleeding which means soaking more than 1 pad per hour    BIRTH CONTROL  · It is possible to become pregnant at any time after delivery and  "while breastfeeding.  · Plan to discuss a method of birth control with your physician at your follow up visit. visit.    DIET AND ELIMINATION  · Eating more fiber (bran cereal, fruits, and vegetables) and drinking plenty of fluids will help to avoid constipation.  · Urinary frequency after childbirth is normal.    POSTPARTUM BLUES  During the first few days after birth, you may experience a sense of the \"blues\" which may include impatience, irritability or even crying.  These feeling come and go quickly.  However, as many as 1 in 10 women experience emotional symptoms known as postpartum depression.    Postpartum depression:  May start as early as the second or third day after delivery or take several weeks or months to develop.  Symptoms of \"blues\" are present, but are more intense:  Crying spells; loss of appetite; feelings of hopelessness or loss of control; fear of touching the baby; over concern or no concern at all about the baby; little or no concern about your own appearance/caring for yourself; and/or inability to sleep or excessive sleeping.  Contact your physician if you are experiencing any of these symptoms.    Crisis Hotline:  · [place] Crisis Hotline:  1-018-SYXHLRY  Or 1-763.435.9065  · [place] Crisis Hotline:  1-295.300.2426  Or 384-417-7530    PREVENTING SHAKEN BABY:  If you are angry or stressed, PUT THE BABY IN THE CRIB, step away, take some deep breaths, and wait until you are calm to care for the baby.  DO NOT SHAKE THE BABY.  You are not alone, call a supporter for help.    · Crisis Call Center 24/7 crisis line 991-921-0052 or 1-258.178.7248  · You can also text them, text \"ANSWER\" to 642257    QUIT SMOKING/TOBACCO USE:  I understand the use of any tobacco products increases my chance of suffering from future heart disease and could cause other illnesses which may shorten my life. Quitting the use of tobacco products is the single most important thing I can do to improve my health. For further " information on smoking / tobacco cessation call a Toll Free Quit Line at 1-949.491.8829 (*National [employer]) or 1-116.382.9667 (American Lung Association) or you can access the web based program at www.lungusa.org.    · [place] Tobacco Users Help Line:  (582) 537-2260       Toll Free: 1-272.398.8369  · Quit Tobacco Program [facility] (361)391-2691    DEPRESSION / SUICIDE RISK:  As you are discharged from this [facility], it is important to learn how to keep safe from harming yourself.    Recognize the warning signs:  · Abrupt changes in personality, positive or negative- including increase in energy   · Giving away possessions  · Change in eating patterns- significant weight changes-  positive or negative  · Change in sleeping patterns- unable to sleep or sleeping all the time   · Unwillingness or inability to communicate  · Depression  · Unusual sadness, discouragement and loneliness  · Talk of wanting to die  · Neglect of personal appearance   · Rebelliousness- reckless behavior  · Withdrawal from people/activities they love  · Confusion- inability to concentrate     If you or a loved one observes any of these behaviors or has concerns about self-harm, here's what you can do:  · Talk about it- your feelings and reasons for harming yourself  · Remove any means that you might use to hurt yourself (examples: pills, rope, extension cords, firearm)  · Get professional help from the community (Mental Health, Substance Abuse, psychological counseling)  · Do not be alone:Call your Safe Contact- someone whom you trust who will be there for you.  · Call your local CRISIS HOTLINE 485-9540 or 840-660-7677  · Call your local Children's Mobile Crisis Response Team [place] (111) 174-1128 or www.[employer]  · Call the toll free National Suicide Prevention Hotlines   · National Suicide Prevention Lifeline 276-967-JKHD (7011)  · National Hope Line Network 800-SUICIDE  (065-0094)    DISCHARGE SURVEY:  Thank you for choosing [facility].  We hope we provided you with very good care.  You may be receiving a survey in the mail.  Please fill it out.  Your opinion is valuable to us.    ADDITIONAL EDUCATIONAL MATERIALS GIVEN TO PATIENT:        My signature on this form indicates that:  1.  I have reviewed and understand the above information  2.  My questions regarding this information have been answered to my satisfaction.  3.  I have formulated a plan with my discharge nurse to obtain my prescribed medication for home.

## 2018-08-05 NOTE — CONSULTS
Baby swaddled and asleep in mothers arms. MOB states she has been able to breastfeed with comfortable latch. Nipples tender but intact.  Encouraged to do continuous skin to skin care while she and FOB are awake, and breastfeed when baby makes hunger cues.  Reviewed [facility] booklet on bfdg education, feeding frequency norms for first 5 days, how breasts make enough milk.  Enc to call for latch assessment with next feeding. Discussed pacifier use in first weeks.

## 2018-08-05 NOTE — DISCHARGE SUMMARY
[facility]  NORMAL SPONTANEOUS VAGINAL DISCHARGE SUMMARY    PATIENT ID:  NAME:  Marlen England  MRN:               [id number]  YOB: 1995    DATE OF ADMISSION: 2018    DATE OF DISCHARGE: 2018     ADMITTING DIAGNOSIS:  1. Intrauterine pregnancy at 40w0d.    DISCHARGE DIAGNOSIS:  1. s/p       HOSPITAL COURSE: This is a 23 y.o. year old female admitted at 40w1d who presented with contractions, no LOF, no vaginal bleeding, good FM and in active labor. Pt was 5 cm dilated, 80% effaced and at  -1 station on sterile vaginal exam. Pregnancy was complicated by maternal pyelonephritis early in pregnancy. The patient had a good labor pattern after admission and proceeded to deliver a viable female infant weighing  7 lbs and 1 oz. Infants Apgars scores were 8 and 9 at one and five minutes. The patients postpartum course was uncomplicated and she was discharged home in stable condition on postpartum day #1.    PROCEDURES PERFORMED: Normal spontaneous vaginal delivery over hemostatic 1st degree laceration that did not require repair.    COMPLICATIONS: None    DIET: Regular    ACTIVITY: No intercourse and nothing inserted into the vagina for 5 weeks.    MEDICATIONS:  Current Outpatient Prescriptions   Medication Sig Dispense Refill   • ibuprofen (MOTRIN) 600 MG Tab Take 1 Tab by mouth every 6 hours as needed for up to 10 days. 30 Tab 3         FOLLOWUP:  1) Dr. Gaitan at [facility] in 5-6 weeks  2) Return to the hospital if copious vaginal bleeding or foul smelling discharge is noted

## 2018-08-06 NOTE — PROGRESS NOTES
Discharged home in stable condition with all personal belongings, written information on self and infant care including prescriptions and follow up instructions. Patient expresses understanding and all questions answered. Patient doing well with infant and feels comfortable with her feeding plan. Discharged home at 2000 and escorted out by staff.

## 2019-02-15 ENCOUNTER — HOSPITAL ENCOUNTER (EMERGENCY)
Dept: HOSPITAL 8 - ED | Age: 24
Discharge: HOME | End: 2019-02-15
Payer: MEDICAID

## 2019-02-15 VITALS — WEIGHT: 130.07 LBS | HEIGHT: 67 IN | BODY MASS INDEX: 20.42 KG/M2

## 2019-02-15 VITALS — DIASTOLIC BLOOD PRESSURE: 74 MMHG | SYSTOLIC BLOOD PRESSURE: 121 MMHG

## 2019-02-15 DIAGNOSIS — H66.002: Primary | ICD-10-CM

## 2019-02-15 PROCEDURE — 99283 EMERGENCY DEPT VISIT LOW MDM: CPT

## 2019-02-15 NOTE — NUR
PT GIVEN DC INSTRUCTIONS AND SCRIPTS. PT EDUCATED REGARDING DC MEDICATIONS. PT 
AMB TO DC WITH STEADY GAIT. NO ACUTE DISTRESS AT DC.

## 2019-03-26 ENCOUNTER — HOSPITAL ENCOUNTER (EMERGENCY)
Dept: HOSPITAL 8 - ED | Age: 24
Discharge: HOME | End: 2019-03-26
Payer: MEDICAID

## 2019-03-26 VITALS — DIASTOLIC BLOOD PRESSURE: 67 MMHG | SYSTOLIC BLOOD PRESSURE: 104 MMHG

## 2019-03-26 VITALS — HEIGHT: 67 IN | BODY MASS INDEX: 20.66 KG/M2 | WEIGHT: 131.62 LBS

## 2019-03-26 DIAGNOSIS — J00: ICD-10-CM

## 2019-03-26 DIAGNOSIS — H66.001: Primary | ICD-10-CM

## 2019-03-26 PROCEDURE — 99283 EMERGENCY DEPT VISIT LOW MDM: CPT

## 2019-07-18 ENCOUNTER — HOSPITAL ENCOUNTER (EMERGENCY)
Dept: HOSPITAL 8 - ED | Age: 24
Discharge: HOME | End: 2019-07-18
Payer: MEDICAID

## 2019-07-18 VITALS — SYSTOLIC BLOOD PRESSURE: 100 MMHG | DIASTOLIC BLOOD PRESSURE: 61 MMHG

## 2019-07-18 VITALS — WEIGHT: 133.27 LBS | BODY MASS INDEX: 20.92 KG/M2 | HEIGHT: 67 IN

## 2019-07-18 DIAGNOSIS — N30.00: Primary | ICD-10-CM

## 2019-07-18 LAB
CULTURE INDICATED?: YES
HCG UR SG: 1.02 (ref 1–1.03)
MICROSCOPIC: (no result)

## 2019-07-18 PROCEDURE — 81001 URINALYSIS AUTO W/SCOPE: CPT

## 2019-07-18 PROCEDURE — 87086 URINE CULTURE/COLONY COUNT: CPT

## 2019-07-18 PROCEDURE — 99283 EMERGENCY DEPT VISIT LOW MDM: CPT

## 2019-07-18 PROCEDURE — 87077 CULTURE AEROBIC IDENTIFY: CPT

## 2019-07-18 PROCEDURE — 81025 URINE PREGNANCY TEST: CPT

## 2019-07-18 NOTE — NUR
PT CAME IN TODAY FOR PAINFUL URINATION FOR THE LAST 3 DAYS. STATES SHE HAS HAD 
MULTIPLE UTI'S IN THE PAST. MD AT BEDSIDE TO UPDATE PT ON POC. EDDIE ARENAS. CALL 
LIGHT IN REACH. PT RESTING ON GURNEY WITH FAMILY AT BEDSIDE.

## 2019-09-13 NOTE — CARE PLAN
Problem: Communication  Goal: The ability to communicate needs accurately and effectively will improve  Outcome: PROGRESSING AS EXPECTED  POC discussed at bedside - patient verbalizes understanding.  Education on medications and procedures provided.   White board updated, patient encouraged to call for all needs. Calls appropriately. No immediate concerns at this time.       Problem: Safety  Goal: Will remain free from falls  Outcome: PROGRESSING AS EXPECTED  [facility]:     Last Known Fall: No falls  Mobility: No limitations  Medications: No meds  Mental Status/LOC/Awareness: Awake, alert, and oriented to date, place, and person  Toileting Needs: No needs  Volume/Electrolyte Status: Use of IV fluids/tube feeds  Communication/Sensory: No deficits  Behavior: Appropriate behavior  Arelis Thorne Fall Risk Total: 3  Fall Risk Level: NO RISK    Universal Fall Precautions:  bed in low position and locked, call light/belongings in reach, siderails up x 2, use non-slip footwear, adequate lighting, clutter free and spill free environment, educate on level of risk, educate to call for assistance    Fall Risk Level Interventions:          Patient Specific Interventions:     Medication: review medications with patient and family and limit combination of prn medications  Mental Status/LOC/Awareness: check on patient hourly and reinforce the use of call light  Toileting: provide frquent toileting, monitor intake and output/use of appropriate interventions and instruct patient/family on the use of grab bars  Volume/Electrolyte Status: ensure patient remains hydrated, monitor abnormal lab values and ensure IV fluids are appropriate  Communication/Sensory: update plan of care on whiteboard  Behavioral: not applicable  Mobility: not applicable      
Problem: Communication  Goal: The ability to communicate needs accurately and effectively will improve  Outcome: PROGRESSING AS EXPECTED  POC discussed at bedside - patient verbalizes understanding.  Education on medications and procedures provided.   White board updated, patient encouraged to call for all needs. Calls appropriately. No immediate concerns at this time.       Problem: Safety  Goal: Will remain free from falls  Outcome: PROGRESSING AS EXPECTED  Arelis Thorne Fall Risk Assessment:     Last Known Fall: No falls  Mobility: No limitations  Medications: No meds  Mental Status/LOC/Awareness: Awake, alert, and oriented to date, place, and person  Toileting Needs: No needs  Volume/Electrolyte Status: No problems  Communication/Sensory: No deficits  Behavior: Appropriate behavior  Arelis Thorne Fall Risk Total: 1  Fall Risk Level: NO RISK    Universal Fall Precautions:  call light/belongings in reach, bed in low position and locked, siderails up x 2, use non-slip footwear, adequate lighting, clutter free and spill free environment, educate on level of risk, educate to call for assistance    Fall Risk Level Interventions:          Patient Specific Interventions:     Medication: review medications with patient and family  Mental Status/LOC/Awareness: check on patient hourly  Toileting: instruct patient/family on the use of grab bars  Volume/Electrolyte Status: ensure patient remains hydrated, monitor abnormal lab values and ensure IV fluids are appropriate  Communication/Sensory: update plan of care on whiteboard  Behavioral: not applicable  Mobility: not applicable      
Problem: Communication  Goal: The ability to communicate needs accurately and effectively will improve  Outcome: PROGRESSING AS EXPECTED  POC discussed at bedside - patient verbalizes understanding.  Education on medications and procedures provided.   White board updated, patient encouraged to call for all needs. Calls appropriately. No immediate concerns at this time.       Problem: Safety  Goal: Will remain free from falls  Outcome: PROGRESSING AS EXPECTED  Arelis Thorne Fall Risk Assessment:     Last Known Fall: No falls  Mobility: No limitations  Medications: No meds  Mental Status/LOC/Awareness: Awake, alert, and oriented to date, place, and person  Toileting Needs: No needs  Volume/Electrolyte Status: Use of IV fluids/tube feeds  Communication/Sensory: No deficits  Behavior: Appropriate behavior  Arelis Thorne Fall Risk Total: 3  Fall Risk Level: NO RISK    Universal Fall Precautions:  call light/belongings in reach, bed in low position and locked, siderails up x 2, use non-slip footwear, adequate lighting, clutter free and spill free environment, educate to call for assistance, educate on level of risk    Fall Risk Level Interventions:          Patient Specific Interventions:     Medication: review medications with patient and family and limit combination of prn medications  Mental Status/LOC/Awareness: check on patient hourly  Toileting: monitor intake and output/use of appropriate interventions  Volume/Electrolyte Status: ensure patient remains hydrated and monitor abnormal lab values  Communication/Sensory: update plan of care on whiteboard  Behavioral: not applicable  Mobility: not applicable    Problem: Infection  Goal: Will remain free from infection  Outcome: PROGRESSING AS EXPECTED    Intervention: Assess signs and symptoms of infection  Temperature assessed q4h and PRN Tylenol added to MAR 1/2/2018.   Education on soap and water hand washing to prevent spread of infection         
Problem: Communication  Goal: The ability to communicate needs accurately and effectively will improve  Outcome: PROGRESSING AS EXPECTED  Patient oriented to the call bell and encouraged to call when assistance is needed. Patient verbalizes understanding.    Problem: Safety  Goal: Will remain free from injury  Outcome: PROGRESSING AS EXPECTED  PT independent in the room, gait steady but is hooked to IV so encouraged to call for assist to the bathroom. Call bell in reach.      
Problem: Infection  Goal: Will remain free from infection  Outcome: PROGRESSING SLOWER THAN EXPECTED  PT still having intermittent fevers. Abx and Tylenol therapy.    Problem: Venous Thromboembolism (VTW)/Deep Vein Thrombosis (DVT) Prevention:  Goal: Patient will participate in Venous Thrombosis (VTE)/Deep Vein Thrombosis (DVT)Prevention Measures  Outcome: PROGRESSING AS EXPECTED  PT uses SCDs      
Problem: Nutritional:  Goal: Achieve adequate nutritional intake  Patient will consume >50% of meals  Outcome: NOT MET  See dietary note.    RD following      
Problem: Safety  Goal: Will remain free from falls    Intervention: Implement fall precautions  Pt has been ambulating within room independently and pt has been instructed to call for assistance if needed      Problem: Knowledge Deficit  Goal: Knowledge of disease process/condition, treatment plan, diagnostic tests, and medications will improve    Intervention: Explain information regarding disease process/condition, treatment plan, diagnostic tests, and medications and document in education  Pt updated about plan of care, pt expresses verbal understanding        
85.7

## 2021-03-17 ENCOUNTER — HOSPITAL ENCOUNTER (EMERGENCY)
Dept: HOSPITAL 8 - ED | Age: 26
Discharge: HOME | End: 2021-03-17
Payer: MEDICAID

## 2021-03-17 VITALS — BODY MASS INDEX: 24.53 KG/M2 | WEIGHT: 156.31 LBS | HEIGHT: 67 IN

## 2021-03-17 VITALS — SYSTOLIC BLOOD PRESSURE: 109 MMHG | DIASTOLIC BLOOD PRESSURE: 69 MMHG

## 2021-03-17 DIAGNOSIS — R19.7: ICD-10-CM

## 2021-03-17 DIAGNOSIS — R11.2: ICD-10-CM

## 2021-03-17 DIAGNOSIS — Z20.822: ICD-10-CM

## 2021-03-17 DIAGNOSIS — A08.4: Primary | ICD-10-CM

## 2021-03-17 PROCEDURE — 99283 EMERGENCY DEPT VISIT LOW MDM: CPT

## 2021-03-17 PROCEDURE — U0003 INFECTIOUS AGENT DETECTION BY NUCLEIC ACID (DNA OR RNA); SEVERE ACUTE RESPIRATORY SYNDROME CORONAVIRUS 2 (SARS-COV-2) (CORONAVIRUS DISEASE [COVID-19]), AMPLIFIED PROBE TECHNIQUE, MAKING USE OF HIGH THROUGHPUT TECHNOLOGIES AS DESCRIBED BY CMS-2020-01-R: HCPCS

## 2021-03-17 NOTE — NUR
re-evaluation done. patient discharged with prescription and instruction. 
verbalized understanding.

## 2022-03-21 ENCOUNTER — TELEPHONE (OUTPATIENT)
Dept: MEDICAL GROUP | Facility: OTHER | Age: 27
End: 2022-03-21
Payer: MEDICAID

## 2022-03-21 DIAGNOSIS — L70.0 ACNE VULGARIS: ICD-10-CM

## 2022-03-21 NOTE — TELEPHONE ENCOUNTER
Pt called requesting a referral to [facility], has been seeing them and paying out of pocket for her visits, she just recently found out that they will now take her insurance but she needs a referral.  She has been seeing them for acne, please advise

## 2022-05-04 ENCOUNTER — OFFICE VISIT (OUTPATIENT)
Dept: MEDICAL GROUP | Facility: CLINIC | Age: 27
End: 2022-05-04
Payer: MEDICAID

## 2022-05-04 VITALS
SYSTOLIC BLOOD PRESSURE: 110 MMHG | WEIGHT: 161 LBS | HEIGHT: 67 IN | HEART RATE: 76 BPM | TEMPERATURE: 98 F | BODY MASS INDEX: 25.27 KG/M2 | DIASTOLIC BLOOD PRESSURE: 80 MMHG | OXYGEN SATURATION: 96 %

## 2022-05-04 DIAGNOSIS — N89.8 VAGINAL DISCHARGE: ICD-10-CM

## 2022-05-04 DIAGNOSIS — Z30.431 IUD CHECK UP: ICD-10-CM

## 2022-05-04 PROCEDURE — 99213 OFFICE O/P EST LOW 20 MIN: CPT | Performed by: FAMILY MEDICINE

## 2022-05-04 RX ORDER — DOXYCYCLINE HYCLATE 100 MG/1
CAPSULE ORAL
COMMUNITY
Start: 2022-03-30 | End: 2022-10-04

## 2022-05-04 ASSESSMENT — PATIENT HEALTH QUESTIONNAIRE - PHQ9: CLINICAL INTERPRETATION OF PHQ2 SCORE: 0

## 2022-05-09 PROBLEM — Z30.431 IUD CHECK UP: Status: ACTIVE | Noted: 2022-05-09

## 2022-05-09 ASSESSMENT — ENCOUNTER SYMPTOMS
EYES NEGATIVE: 1
RESPIRATORY NEGATIVE: 1
PSYCHIATRIC NEGATIVE: 1
CONSTITUTIONAL NEGATIVE: 1
NEUROLOGICAL NEGATIVE: 1
GASTROINTESTINAL NEGATIVE: 1
CARDIOVASCULAR NEGATIVE: 1

## 2022-05-09 NOTE — PROGRESS NOTES
Subjective:   CC:   Chief Complaint   Patient presents with   • Follow-Up     IUD check        HPI: Marlen is a 27 y.o. female who presents today for the following problems:    Problem   IUD Check Up    Marlen is here to see me today for an IUD check.  She is a 27-year-old female who states that she can no longer feel the strings to her IUD.  She states that her  has felt the strings in the past but has not been able to feel the strings lately.  She is concerned that her IUD may have fallen out.  She denies any trauma denies any bleeding denies any pain to her pelvis or abdomen.         Current Outpatient Medications   Medication Sig Dispense Refill   • doxycycline (VIBRAMYCIN) 100 MG Cap TAKE 1 CAPSULE BY MOUTH TWICE DAILY WITH FOOD AND A FULL GLASS OF WATER. DO NOT LIE DOWN UNTIL 1 HOUR AFTER TAKING. PROTECT YOURSELF FROM THE SUN     • pyridoxine (VITAMIN B-6) 25 MG Tab Take 1 Tab by mouth every evening. (Patient not taking: Reported on 5/4/2022) 30 Tab 0   • folic acid (FOLVITE) 1 MG Tab Take 1 Tab by mouth every day. (Patient not taking: Reported on 5/4/2022) 30 Tab 0   • tamsulosin (FLOMAX) 0.4 MG capsule Take 1 Cap by mouth every day. (Patient not taking: Reported on 5/4/2022) 60 Cap 0   • Prenatal Vit-Fe Fumarate-FA (PRENATAL 19 PO) Take 1 Tab by mouth every day. (Patient not taking: Reported on 5/4/2022)     • acetaminophen (TYLENOL) 500 MG Tab Take 1,000 mg by mouth every 6 hours as needed for Moderate Pain. (Patient not taking: Reported on 5/4/2022)       No current facility-administered medications for this visit.       Social History     Tobacco Use   • Smoking status: Never Smoker   • Smokeless tobacco: Never Used   Substance Use Topics   • Alcohol use: No   • Drug use: No       Review of Systems   Constitutional: Negative.    HENT: Negative.    Eyes: Negative.    Respiratory: Negative.    Cardiovascular: Negative.    Gastrointestinal: Negative.    Skin: Negative.    Neurological: Negative.   "  Psychiatric/Behavioral: Negative.          Objective:     Vitals:    05/04/22 1029   BP: 110/80   BP Location: Left arm   Pulse: 76   Temp: 36.7 °C (98 °F)   SpO2: 96%   Weight: 73 kg (161 lb)   Height: 1.702 m (5' 7\")     Body mass index is 25.22 kg/m².     Physical Exam  Vitals reviewed.   Constitutional:       Appearance: Normal appearance.   Cardiovascular:      Rate and Rhythm: Normal rate and regular rhythm.      Pulses: Normal pulses.   Pulmonary:      Effort: Pulmonary effort is normal.      Breath sounds: Normal breath sounds.   Genitourinary:     General: Normal vulva.      Vagina: Vaginal discharge present.   Neurological:      Mental Status: She is alert.          Assessment & Plan:   IUD check up  On examination today the strings were completely visualized.  Her IUD is still in place and have asked her if she had noticed that there was if there was any discharge.  On examination there was some significant discharge I will go ahead and check her for chlamydia and gonorrhea HIV RPR.  We will have her follow-up in a week or 2.      Followup: Return in about 2 weeks (around 5/18/2022), or if symptoms worsen or fail to improve.    Nixon Dougherty M.D.    Please note that this dictation was created using voice recognition software. I have made every reasonable attempt to correct obvious errors, but I expect that there are errors of grammar and possibly content that I did not discover before finalizing the note.  "

## 2022-05-09 NOTE — ASSESSMENT & PLAN NOTE
On examination today the strings were completely visualized.  Her IUD is still in place and have asked her if she had noticed that there was if there was any discharge.  On examination there was some significant discharge I will go ahead and check her for chlamydia and gonorrhea HIV RPR.  We will have her follow-up in a week or 2.

## 2022-10-04 ENCOUNTER — HOSPITAL ENCOUNTER (OUTPATIENT)
Facility: MEDICAL CENTER | Age: 27
End: 2022-10-04
Attending: STUDENT IN AN ORGANIZED HEALTH CARE EDUCATION/TRAINING PROGRAM
Payer: MEDICAID

## 2022-10-04 ENCOUNTER — OFFICE VISIT (OUTPATIENT)
Dept: MEDICAL GROUP | Facility: CLINIC | Age: 27
End: 2022-10-04
Payer: MEDICAID

## 2022-10-04 VITALS
SYSTOLIC BLOOD PRESSURE: 123 MMHG | WEIGHT: 167.8 LBS | HEIGHT: 67 IN | DIASTOLIC BLOOD PRESSURE: 77 MMHG | OXYGEN SATURATION: 98 % | HEART RATE: 84 BPM | BODY MASS INDEX: 26.34 KG/M2

## 2022-10-04 DIAGNOSIS — N63.0 LUMP IN FEMALE BREAST: ICD-10-CM

## 2022-10-04 DIAGNOSIS — Z30.431 IUD CHECK UP: ICD-10-CM

## 2022-10-04 DIAGNOSIS — R07.1 CHEST PAIN ON BREATHING: ICD-10-CM

## 2022-10-04 DIAGNOSIS — N89.8 VAGINAL DISCHARGE: ICD-10-CM

## 2022-10-04 DIAGNOSIS — Z12.4 SCREENING FOR CERVICAL CANCER: ICD-10-CM

## 2022-10-04 PROCEDURE — 99214 OFFICE O/P EST MOD 30 MIN: CPT | Mod: GC | Performed by: STUDENT IN AN ORGANIZED HEALTH CARE EDUCATION/TRAINING PROGRAM

## 2022-10-04 RX ORDER — CLINDAMYCIN PHOSPHATE AND BENZOYL PEROXIDE 10; 50 MG/G; MG/G
GEL TOPICAL
COMMUNITY
Start: 2022-09-09

## 2022-10-04 RX ORDER — BENZOYL PEROXIDE 2.5 G/100G
GEL TOPICAL
COMMUNITY
Start: 2022-09-14 | End: 2023-09-11 | Stop reason: SDUPTHER

## 2022-10-04 RX ORDER — CLINDAMYCIN PHOSPHATE 10 MG/G
GEL TOPICAL
COMMUNITY
Start: 2022-09-08 | End: 2023-09-11 | Stop reason: SDUPTHER

## 2022-10-04 RX ORDER — KETOCONAZOLE 20 MG/ML
SHAMPOO TOPICAL
COMMUNITY
Start: 2022-08-23

## 2022-10-04 NOTE — ASSESSMENT & PLAN NOTE
On pelvic exam, there is copious mucus production, not obvious evidence of BV macroscopically. IUD strings visualized. Some mild irritation of the cervix around the os. Collected Pap smear with GC, CT, Affirm - including Trichomonas, BV, and yeast. F/u with results.  Explained to patient that resumption of menstrual cycle is very likely related to stress. Doesn't mean they'll continue.

## 2022-10-04 NOTE — PROGRESS NOTES
"Subjective:     CC: Multiple issues as below    HPI:   Marlen presents today with     Problem   Lump in Female Breast    Patient noticed a firm lump on her right breast at the end of June. Has IUD in place for four years without a menstrual cycle and then her menstrual cycle resumed this summer around the same time she noticed the lump, so she is wondering if there's any correlation. 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  Regarding the lump, it is not painful.    No family hx of breast cancer.  No nipple discharge or bleeding.     Chest Pain On Breathing    Started a few weeks ago. Sternum, worse with deep breaths. Lasts sometimes two hours. Random onset.  No nausea.  No palpitations.  Not clearly precipitated by panic.  No diaphoresis.  No chest pain with exercise.  More anxiety than usual lately.  Happened only a few times.   7/10 when it comes on.  Currently not experiencing pain.     Vaginal Discharge    Patient describes more white discharge lately prior to starting her menstrual cycle. No vaginal itching or pain with intercourse.   Getting menstrual cycles now after four years of not getting them with IUD. More stressed lately.         Current Outpatient Medications Ordered in Epic   Medication Sig Dispense Refill    Benzoyl Peroxide 2.5 % Gel MIX IN EQUAL PARTS WITH CLINDAMYCIN GEL. APPLY A THIN LAYER TO THE FACE EVERY MORNING      clindamycin (CLEOCIN T) 1 % Gel MIX IN EQUAL PARTA WITH BENZOYL PEROXIDE GEL. APPLY A THIN LAYER TO THE FACE EVERY MORNING      Clindamycin-Benzoyl Per, Refr, 1.2-5 % Gel APPLY THIN LAYER TOPICALLY TO FACE EVERY MORNING      ketoconazole (NIZORAL) 2 % shampoo        No current Epic-ordered facility-administered medications on file.         Objective:     Exam:  /77 (BP Location: Right arm, Patient Position: Sitting, BP Cuff Size: Adult)   Pulse 84   Ht 1.702 m (5' 7\")   Wt 76.1 kg (167 lb 12.8 oz)   SpO2 98%   BMI 26.28 kg/m²  " Body mass index is 26.28 kg/m².    General: Normal appearing. No distress.  HEENT: Normocephalic. Eyes conjunctiva clear lids without ptosis, pupils equal and reactive to light accommodation, ears normal shape and contour, canals are clear bilaterally, tympanic membranes are benign, nasal mucosa benign, oropharynx is without erythema, edema or exudates.   Chest wall: Right breast with hard nodule superior to nipple, well-circumscribed, non-tender, mobile  Pulmonary: Clear to ausculation.  Normal effort. No rales, ronchi, or wheezing.  Cardiovascular: Regular rate and rhythm without murmur.   Lymph: No cervical or supraclavicular lymph nodes are palpable  Skin: Warm and dry.  No obvious lesions.  Musculoskeletal: Normal gait. No extremity cyanosis, clubbing, or edema.  : Normal external genitalia, excessive thin mucous discharge, mildly irritate cervical os      Assessment & Plan:     27 y.o. female with the following -     Problem List Items Addressed This Visit       Lump in female breast     Well circumscribed, mobile, non-tender mass superior to right nipple. Hard nodule. Differentials include cyst, clogged milk duct, abscess, lymph node. Will obtain US for confirmation.          Relevant Orders    US-LOCALIZATION BREAST SINGLE RIGHT    Chest pain on breathing     Likely related to anxiety, though she doesn't have the usual symptoms of diaphoresis and palpitations associated with this. No symptoms at present. Physical exam unremarkable. No risk factors for ACS. ER precautions given to patient. Low utility of getting EKG today in absence of symptoms. Booked appointment with Dr. GUAN for next week to discuss anxiety. F/u with me in 2 months to consider medical management, patient not open today.           Vaginal discharge     On pelvic exam, there is copious mucus production, not obvious evidence of BV macroscopically. IUD strings visualized. Some mild irritation of the cervix around the os. Collected Pap smear  with GC, CT, Affirm - including Trichomonas, BV, and yeast. F/u with results.  Explained to patient that resumption of menstrual cycle is very likely related to stress. Doesn't mean they'll continue.         Relevant Orders    PAP LB, CT-NG TV    BV+TRICH DAVID+YEAST CULTURE     Other Visit Diagnoses       IUD check up        Screening for cervical cancer        Relevant Orders    PAP LB, CT-NG TV              No follow-ups on file.    Julia Seth MD   PGY-3

## 2022-10-04 NOTE — ASSESSMENT & PLAN NOTE
Well circumscribed, mobile, non-tender mass superior to right nipple. Hard nodule. Differentials include cyst, clogged milk duct, abscess, lymph node. Will obtain US for confirmation.

## 2022-10-04 NOTE — ASSESSMENT & PLAN NOTE
Likely related to anxiety, though she doesn't have the usual symptoms of diaphoresis and palpitations associated with this. No symptoms at present. Physical exam unremarkable. No risk factors for ACS. ER precautions given to patient. Low utility of getting EKG today in absence of symptoms. Booked appointment with Dr. GUAN for next week to discuss anxiety. F/u with me in 2 months to consider medical management, patient not open today.

## 2022-10-05 LAB
FORWARD REASON: SPWHY: NORMAL
FORWARD REASON: SPWHY: NORMAL
FORWARDED TO LAB: SPWHR: NORMAL
FORWARDED TO LAB: SPWHR: NORMAL
SPECIMEN SENT (2ND): SPWT2: NORMAL
SPECIMEN SENT (3RD): SPWT3: NORMAL
SPECIMEN SENT (4TH): SPWT4: NORMAL
SPECIMEN SENT: SPWT1: NORMAL
SPECIMEN SENT: SPWT1: NORMAL

## 2022-10-10 ENCOUNTER — OFFICE VISIT (OUTPATIENT)
Dept: MEDICAL GROUP | Facility: CLINIC | Age: 27
End: 2022-10-10
Payer: MEDICAID

## 2022-10-10 DIAGNOSIS — F41.9 ANXIETY: ICD-10-CM

## 2022-10-10 PROCEDURE — 90834 PSYTX W PT 45 MINUTES: CPT | Performed by: PSYCHOLOGIST

## 2022-10-10 NOTE — PROGRESS NOTES
[facility]  Psychotherapy Consult  Full therapy note has been documented and is under restricted viewing.  Please see below for summary of today's session.     Patient Name: Marlen England  Patient MRN: [id number]  Today's Date:  10/10/22    Type of session: intake assessment  Session start time: 10  Session stop time: 10:45  Length of time spent face to face with patient: 45  Persons in attendance: patient    Diagnoses:   1. Anxiety      Pt. Is seeking counseling for anxiety, she states she is an over thinker.  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  Wants to be an excellent mother and partner, struggles with whether she is good enough.    Two children, 4 and 9.  4 year old daughter is hers and her current partner of 5 years.  9 year old son is with her ex-partner, they share him and she reports that they had a very difficult relationship when they were together.    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  Excited about nursing as a career.  This is her second time applying, her scores are strong.    Has a few friends, mainly recently in school.      No history of counseling.  No [employer] community, not important to her right now.     No SI.  No HEMA.    Has been exercising regularly for the past year, feels this is helpful.  Also colors in coloring books to help manage stress.    Accepted referrals to [facility].  And can f/u with this writer for additional consultation/support.    Torrie Root, Ph.D.

## 2022-10-19 ENCOUNTER — PHARMACY VISIT (OUTPATIENT)
Dept: PHARMACY | Facility: MEDICAL CENTER | Age: 27
End: 2022-10-19
Payer: COMMERCIAL

## 2022-10-19 PROCEDURE — RXMED WILLOW AMBULATORY MEDICATION CHARGE: Performed by: INTERNAL MEDICINE

## 2022-10-19 RX ORDER — INFLUENZA A VIRUS A/BRISBANE/02/2018 IVR-190 (H1N1) ANTIGEN (FORMALDEHYDE INACTIVATED), INFLUENZA A VIRUS A/KANSAS/14/2017 X-327 (H3N2) ANTIGEN (FORMALDEHYDE INACTIVATED), INFLUENZA B VIRUS B/PHUKET/3073/2013 ANTIGEN (FORMALDEHYDE INACTIVATED), AND INFLUENZA B VIRUS B/MARYLAND/15/2016 BX-69A ANTIGEN (FORMALDEHYDE INACTIVATED) 15; 15; 15; 15 UG/.5ML; UG/.5ML; UG/.5ML; UG/.5ML
INJECTION, SUSPENSION INTRAMUSCULAR
Qty: 0.5 ML | Refills: 0 | OUTPATIENT
Start: 2022-10-19

## 2022-10-26 ENCOUNTER — APPOINTMENT (OUTPATIENT)
Dept: MEDICAL GROUP | Facility: CLINIC | Age: 27
End: 2022-10-26
Payer: MEDICAID

## 2022-10-31 ENCOUNTER — HOSPITAL ENCOUNTER (OUTPATIENT)
Dept: RADIOLOGY | Facility: MEDICAL CENTER | Age: 27
End: 2022-10-31
Attending: STUDENT IN AN ORGANIZED HEALTH CARE EDUCATION/TRAINING PROGRAM
Payer: MEDICAID

## 2022-10-31 ENCOUNTER — NON-PROVIDER VISIT (OUTPATIENT)
Dept: MEDICAL GROUP | Facility: CLINIC | Age: 27
End: 2022-10-31
Payer: MEDICAID

## 2022-10-31 DIAGNOSIS — N63.0 LUMP IN FEMALE BREAST: ICD-10-CM

## 2022-10-31 DIAGNOSIS — Z11.1 ENCOUNTER FOR PPD TEST: ICD-10-CM

## 2022-10-31 PROCEDURE — 99999 PR NO CHARGE: CPT | Performed by: FAMILY MEDICINE

## 2022-10-31 PROCEDURE — 86580 TB INTRADERMAL TEST: CPT | Performed by: FAMILY MEDICINE

## 2022-10-31 PROCEDURE — 76642 ULTRASOUND BREAST LIMITED: CPT | Mod: RT

## 2022-10-31 NOTE — PROGRESS NOTES
Marlen Bearden is a 27 y.o. female here for a non-provider visit for PPD placement -- Step 1 of 2    Reason for PPD:  school requirement    1. TB evaluation questionnaire completed by patient? Yes      -  If any answers marked yes did you contact a provider prior to placing? No  2.  Patient notified to return to clinic for reading on: Wednesday, 11/2/2022 at 11:20AM  3.  PPD Placement documentation completed on TB evaluation questionnaire? Yes  4.  Location of TB evaluation questionnaire filed: [facility]

## 2022-11-03 ENCOUNTER — NON-PROVIDER VISIT (OUTPATIENT)
Dept: MEDICAL GROUP | Facility: CLINIC | Age: 27
End: 2022-11-03
Payer: MEDICAID

## 2022-11-03 LAB — TB WHEAL 3D P 5 TU DIAM: NORMAL MM

## 2022-11-03 PROCEDURE — 99999 PR NO CHARGE: CPT | Performed by: FAMILY MEDICINE

## 2022-11-03 NOTE — PROGRESS NOTES
Marlen BowersostaPremaRoz is a 27 y.o. female here for a non-provider visit for PPD reading -- Step 2 of 2.      1.  Resulted in Epic under enter/edit results? Yes   2.  TB evaluation questionnaire scanned into chart and original given to patient?Yes      3. Was induration greater than 0 mm? No.    If Step 1 of 2, when is patient returning for second step (delete if N/A): 11/16/2022    Routed to PCP? No

## 2022-11-04 ENCOUNTER — HOSPITAL ENCOUNTER (OUTPATIENT)
Dept: RADIOLOGY | Facility: MEDICAL CENTER | Age: 27
End: 2022-11-04
Attending: STUDENT IN AN ORGANIZED HEALTH CARE EDUCATION/TRAINING PROGRAM
Payer: MEDICAID

## 2022-11-04 DIAGNOSIS — R92.8 ABNORMAL ULTRASOUND OF BREAST: ICD-10-CM

## 2022-11-04 PROCEDURE — 19083 BX BREAST 1ST LESION US IMAG: CPT | Mod: RT

## 2022-11-04 PROCEDURE — 88305 TISSUE EXAM BY PATHOLOGIST: CPT

## 2022-11-07 ENCOUNTER — TELEPHONE (OUTPATIENT)
Dept: RADIOLOGY | Facility: MEDICAL CENTER | Age: 27
End: 2022-11-07
Payer: MEDICAID

## 2022-11-14 LAB — PATHOLOGY CONSULT NOTE: NORMAL

## 2022-11-16 ENCOUNTER — NON-PROVIDER VISIT (OUTPATIENT)
Dept: MEDICAL GROUP | Facility: CLINIC | Age: 27
End: 2022-11-16
Payer: MEDICAID

## 2022-11-16 DIAGNOSIS — Z11.1 PPD SCREENING TEST: ICD-10-CM

## 2022-11-16 PROCEDURE — 99999 PR NO CHARGE: CPT | Performed by: FAMILY MEDICINE

## 2022-11-16 PROCEDURE — 86580 TB INTRADERMAL TEST: CPT | Performed by: FAMILY MEDICINE

## 2022-11-16 NOTE — PROGRESS NOTES
Marlen Baerden is a 27 y.o. female here for a non-provider visit for PPD placement -- Step 2 of 2    Reason for PPD:  school requirement    1. TB evaluation questionnaire completed by patient? Yes      -  If any answers marked yes did you contact a provider prior to placing? Yes  2.  Patient notified to return to clinic for reading on: 11/18/22  3.  PPD Placement documentation completed on TB evaluation questionnaire? Yes  4.  Location of TB evaluation questionnaire filed: [facility]

## 2022-11-18 ENCOUNTER — NON-PROVIDER VISIT (OUTPATIENT)
Dept: MEDICAL GROUP | Facility: CLINIC | Age: 27
End: 2022-11-18
Payer: MEDICAID

## 2022-11-18 LAB — TB WHEAL 3D P 5 TU DIAM: 0 MM

## 2022-11-18 PROCEDURE — 99999 PR NO CHARGE: CPT | Performed by: FAMILY MEDICINE

## 2022-11-18 NOTE — PROGRESS NOTES
Marlen Bateman Suleiman is a 27 y.o. female here for a non-provider visit for PPD reading -- Step 2 of 2.      1.  Resulted in Epic under enter/edit results? Yes   2.  TB evaluation questionnaire scanned into chart and original given to patient?Yes      3. Was induration greater than 0 mm? No.        Routed to PCP? Yes

## 2022-12-02 ENCOUNTER — OFFICE VISIT (OUTPATIENT)
Dept: MEDICAL GROUP | Facility: CLINIC | Age: 27
End: 2022-12-02
Payer: MEDICAID

## 2022-12-02 VITALS
TEMPERATURE: 99.3 F | WEIGHT: 166 LBS | DIASTOLIC BLOOD PRESSURE: 68 MMHG | SYSTOLIC BLOOD PRESSURE: 102 MMHG | RESPIRATION RATE: 16 BRPM | BODY MASS INDEX: 26.06 KG/M2 | HEIGHT: 67 IN

## 2022-12-02 DIAGNOSIS — F41.9 ANXIETY: ICD-10-CM

## 2022-12-02 DIAGNOSIS — D24.2 FIBROADENOMA OF BREAST, LEFT: ICD-10-CM

## 2022-12-02 PROCEDURE — 99213 OFFICE O/P EST LOW 20 MIN: CPT | Mod: GE | Performed by: STUDENT IN AN ORGANIZED HEALTH CARE EDUCATION/TRAINING PROGRAM

## 2022-12-02 NOTE — PROGRESS NOTES
"Subjective:     CC: Follow up    HPI:   Marlen presents today with:    Problem   Fibroadenoma of Breast, Left    Completed core biopsy. Found to be benign fibroadenoma. Patient feeling reassured by this.      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 Saw Dr. Root and was given a list of counselors to contact in the area, which she has yet to do.         Current Outpatient Medications Ordered in Epic   Medication Sig Dispense Refill    influenza vaccine quad (FLUZONE QUADRIVALENT) 0.5 ML Suspension Prefilled Syringe injection Inject  into the shoulder, thigh, or buttocks. (Patient not taking: Reported on 12/2/2022) 0.5 mL 0    Benzoyl Peroxide 2.5 % Gel MIX IN EQUAL PARTS WITH CLINDAMYCIN GEL. APPLY A THIN LAYER TO THE FACE EVERY MORNING (Patient not taking: Reported on 12/2/2022)      clindamycin (CLEOCIN T) 1 % Gel MIX IN EQUAL PARTA WITH BENZOYL PEROXIDE GEL. APPLY A THIN LAYER TO THE FACE EVERY MORNING (Patient not taking: Reported on 12/2/2022)      Clindamycin-Benzoyl Per, Refr, 1.2-5 % Gel APPLY THIN LAYER TOPICALLY TO FACE EVERY MORNING (Patient not taking: Reported on 12/2/2022)      ketoconazole (NIZORAL) 2 % shampoo  (Patient not taking: Reported on 12/2/2022)       No current [facility]-ordered facility-administered medications on file.       Objective:     Exam:  /68   Temp 37.4 °C (99.3 °F)   Resp 16   Ht 1.702 m (5' 7\")   Wt 75.3 kg (166 lb)   BMI 26.00 kg/m²  Body mass index is 26 kg/m².    General: Normal appearing. No distress.  Psych: Normal mood and affect. Alert and oriented x3. Judgment and insight is normal.      Assessment & Plan:     27 y.o. female with the following -     Problem List Items Addressed This Visit       Fibroadenoma of breast, left     Completed core biopsy. Found to be benign fibroadenoma. The risk of subsequent breast cancer " is slightly elevated only if there is associated proliferative disease or if there is a significant family history of breast cancer (not present with this patient). Recommended occasional monitoring for growth. Can remove for cosmesis if desired in the future.         Anxiety     Situational, well controlled today. 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 GUAN.              No follow-ups on file.    Julia Seth MD   PGY-3

## 2022-12-02 NOTE — ASSESSMENT & PLAN NOTE
Completed core biopsy. Found to be benign fibroadenoma. The risk of subsequent breast cancer is slightly elevated only if there is associated proliferative disease or if there is a significant family history of breast cancer (not present with this patient). Recommended occasional monitoring for growth. Can remove for cosmesis if desired in the future.

## 2022-12-02 NOTE — ASSESSMENT & PLAN NOTE
Situational, well controlled today. 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 SAXENA

## 2023-06-23 ENCOUNTER — HOSPITAL ENCOUNTER (OUTPATIENT)
Dept: RADIOLOGY | Facility: MEDICAL CENTER | Age: 28
End: 2023-06-23
Attending: STUDENT IN AN ORGANIZED HEALTH CARE EDUCATION/TRAINING PROGRAM
Payer: MEDICAID

## 2023-06-23 DIAGNOSIS — R92.8 ABNORMAL FINDING ON RADIOLOGICAL EXAMINATION OF BREAST: ICD-10-CM

## 2023-06-23 PROCEDURE — 76642 ULTRASOUND BREAST LIMITED: CPT | Mod: RT

## 2023-06-30 ENCOUNTER — HOSPITAL ENCOUNTER (OUTPATIENT)
Dept: RADIOLOGY | Facility: MEDICAL CENTER | Age: 28
End: 2023-06-30
Attending: STUDENT IN AN ORGANIZED HEALTH CARE EDUCATION/TRAINING PROGRAM
Payer: MEDICAID

## 2023-06-30 DIAGNOSIS — R92.8 FOLLOW-UP EXAMINATION OF ABNORMAL MAMMOGRAM: ICD-10-CM

## 2023-06-30 LAB — PATHOLOGY CONSULT NOTE: NORMAL

## 2023-06-30 PROCEDURE — A4648 IMPLANTABLE TISSUE MARKER: HCPCS

## 2023-06-30 PROCEDURE — 88305 TISSUE EXAM BY PATHOLOGIST: CPT

## 2023-07-03 ENCOUNTER — TELEPHONE (OUTPATIENT)
Dept: RADIOLOGY | Facility: MEDICAL CENTER | Age: 28
End: 2023-07-03
Payer: MEDICAID

## 2023-09-11 ENCOUNTER — OFFICE VISIT (OUTPATIENT)
Dept: MEDICAL GROUP | Facility: CLINIC | Age: 28
End: 2023-09-11
Payer: MEDICAID

## 2023-09-11 VITALS
SYSTOLIC BLOOD PRESSURE: 106 MMHG | HEART RATE: 70 BPM | HEIGHT: 67 IN | BODY MASS INDEX: 24.96 KG/M2 | WEIGHT: 159 LBS | DIASTOLIC BLOOD PRESSURE: 70 MMHG | OXYGEN SATURATION: 97 % | TEMPERATURE: 98 F

## 2023-09-11 DIAGNOSIS — Z97.5 IUD (INTRAUTERINE DEVICE) IN PLACE: ICD-10-CM

## 2023-09-11 DIAGNOSIS — L70.0 ACNE VULGARIS: ICD-10-CM

## 2023-09-11 PROCEDURE — 99213 OFFICE O/P EST LOW 20 MIN: CPT | Mod: GE | Performed by: BEHAVIOR ANALYST

## 2023-09-11 PROCEDURE — 3074F SYST BP LT 130 MM HG: CPT | Performed by: BEHAVIOR ANALYST

## 2023-09-11 PROCEDURE — 3078F DIAST BP <80 MM HG: CPT | Performed by: BEHAVIOR ANALYST

## 2023-09-11 RX ORDER — CLINDAMYCIN PHOSPHATE 10 MG/G
GEL TOPICAL
Qty: 60 G | Refills: 3 | Status: SHIPPED | OUTPATIENT
Start: 2023-09-11

## 2023-09-11 RX ORDER — BENZOYL PEROXIDE 2.5 G/100G
GEL TOPICAL
Qty: 60 G | Refills: 3 | Status: SHIPPED | OUTPATIENT
Start: 2023-09-11

## 2023-09-11 ASSESSMENT — PATIENT HEALTH QUESTIONNAIRE - PHQ9: CLINICAL INTERPRETATION OF PHQ2 SCORE: 0

## 2023-09-11 NOTE — PROGRESS NOTES
"Subjective:     CC: IUD     HPI:   Marlen presents today with    Problem   IUD (Intrauterine Device) in Place    -has IUD for 5yrs, it expires this month, here to get evaluated to get it replaced  -around March of 2023 pt started having periods monthly; periods are light ands 3-4d; LMP approx 1.5mo;   -non smoker, no hx of cancers  -no pain, discharge  -does not use barrier contraception, educated this is the only form that prevents STD, pt states she is low risk      Plan:  -educated pt on 3yr extension for mirena devices; and option to replace given light bleeding  -pt will keep mirena device and f/u prn       Acne Vulgaris    -Hx of acne  -rx topicals by dermatology for past year that have helped  -pt insurance change and not eligible to see derm anymore  -acne present on face    Plan:  -re-rx topical acne medication  -f/u prn         ROS: See HPI.     Objective:     Exam:  /70   Pulse 70   Temp 36.7 °C (98 °F)   Ht 1.702 m (5' 7\")   Wt 72.1 kg (159 lb)   SpO2 97%   BMI 24.90 kg/m²  Body mass index is 24.9 kg/m².    Physical Exam:  General: Pt resting in NAD, cooperative   Skin:  acne vulgaris on face, No cyanosis or jaundice   HEENT: NC/AT. EOMI. No conjunctival injection or sclera icterus.   Lungs:  CTAB, good air movement. Non-labored.   Cardiovascular:  S1/S2 RRR   Abdomen:  Abdomen is soft, non-tender, non-distended, +BS  Extremities:  No LE edema   CNS:  No gross focal neurologic deficits  Psych: Appropriate mood and affect     A  Labs: na    Assessment & Plan:     See hpi    Problem List Items Addressed This Visit       IUD (intrauterine device) in place    Acne vulgaris    Relevant Medications    tretinoin (RETIN-A) 0.025 % cream    clindamycin (CLEOCIN T) 1 % Gel    Benzoyl Peroxide 2.5 % Gel               "